# Patient Record
Sex: FEMALE | Employment: FULL TIME | URBAN - METROPOLITAN AREA
[De-identification: names, ages, dates, MRNs, and addresses within clinical notes are randomized per-mention and may not be internally consistent; named-entity substitution may affect disease eponyms.]

---

## 2017-01-16 ENCOUNTER — TELEPHONE (OUTPATIENT)
Dept: OBGYN | Facility: CLINIC | Age: 27
End: 2017-01-16

## 2017-01-16 NOTE — TELEPHONE ENCOUNTER
Patient called this morning and is 4 weeks pregnant.  She would like to set up her first prenatal appointment.  Please contact this patient.  Thank you!  Susan HUDDLESTON  Central Scheduler

## 2017-01-23 NOTE — TELEPHONE ENCOUNTER
Gayla, can you please help this pt get scheduled for NPN nurse and md appts.     Thanks.   Celina

## 2017-02-13 LAB
HBV SURFACE AG SERPL QL IA: NONREACTIVE
HIV 1+2 AB+HIV1 P24 AG SERPL QL IA: NONREACTIVE
RUBELLA ANTIBODY IGG QUANTITATIVE: NORMAL IU/ML
T PALLIDUM IGG SER QL: NONREACTIVE

## 2017-08-24 LAB — GROUP B STREP PCR: NEGATIVE

## 2017-08-28 ENCOUNTER — APPOINTMENT (OUTPATIENT)
Dept: ULTRASOUND IMAGING | Facility: CLINIC | Age: 27
End: 2017-08-28
Attending: OBSTETRICS & GYNECOLOGY
Payer: COMMERCIAL

## 2017-08-28 ENCOUNTER — HOSPITAL ENCOUNTER (INPATIENT)
Facility: CLINIC | Age: 27
LOS: 4 days | Discharge: HOME | End: 2017-09-02
Attending: OBSTETRICS & GYNECOLOGY | Admitting: OBSTETRICS & GYNECOLOGY
Payer: COMMERCIAL

## 2017-08-28 LAB
ABO + RH BLD: NORMAL
ABO + RH BLD: NORMAL
ALT SERPL W P-5'-P-CCNC: 21 U/L (ref 0–50)
AST SERPL W P-5'-P-CCNC: 25 U/L (ref 0–45)
BLD GP AB SCN SERPL QL: NORMAL
BLOOD BANK CMNT PATIENT-IMP: NORMAL
CREAT SERPL-MCNC: 0.45 MG/DL (ref 0.52–1.04)
CREAT UR-MCNC: 48 MG/DL
ERYTHROCYTE [DISTWIDTH] IN BLOOD BY AUTOMATED COUNT: 13.7 % (ref 10–15)
GFR SERPL CREATININE-BSD FRML MDRD: >90 ML/MIN/1.7M2
HCT VFR BLD AUTO: 35.8 % (ref 35–47)
HGB BLD-MCNC: 11.8 G/DL (ref 11.7–15.7)
MCH RBC QN AUTO: 29.5 PG (ref 26.5–33)
MCHC RBC AUTO-ENTMCNC: 33 G/DL (ref 31.5–36.5)
MCV RBC AUTO: 90 FL (ref 78–100)
PLATELET # BLD AUTO: 240 10E9/L (ref 150–450)
PROT UR-MCNC: 0.15 G/L
PROT/CREAT 24H UR: 0.31 G/G CR (ref 0–0.2)
RBC # BLD AUTO: 4 10E12/L (ref 3.8–5.2)
SPECIMEN EXP DATE BLD: NORMAL
WBC # BLD AUTO: 10.1 10E9/L (ref 4–11)

## 2017-08-28 PROCEDURE — 84450 TRANSFERASE (AST) (SGOT): CPT | Performed by: OBSTETRICS & GYNECOLOGY

## 2017-08-28 PROCEDURE — 76815 OB US LIMITED FETUS(S): CPT

## 2017-08-28 PROCEDURE — 25000131 ZZH RX MED GY IP 250 OP 636 PS 637: Performed by: OBSTETRICS & GYNECOLOGY

## 2017-08-28 PROCEDURE — 86850 RBC ANTIBODY SCREEN: CPT | Performed by: OBSTETRICS & GYNECOLOGY

## 2017-08-28 PROCEDURE — 84460 ALANINE AMINO (ALT) (SGPT): CPT | Performed by: OBSTETRICS & GYNECOLOGY

## 2017-08-28 PROCEDURE — 25000132 ZZH RX MED GY IP 250 OP 250 PS 637: Performed by: OBSTETRICS & GYNECOLOGY

## 2017-08-28 PROCEDURE — 86900 BLOOD TYPING SEROLOGIC ABO: CPT | Performed by: OBSTETRICS & GYNECOLOGY

## 2017-08-28 PROCEDURE — 85027 COMPLETE CBC AUTOMATED: CPT | Performed by: OBSTETRICS & GYNECOLOGY

## 2017-08-28 PROCEDURE — 99215 OFFICE O/P EST HI 40 MIN: CPT

## 2017-08-28 PROCEDURE — 99215 OFFICE O/P EST HI 40 MIN: CPT | Mod: 25

## 2017-08-28 PROCEDURE — 25000128 H RX IP 250 OP 636: Performed by: OBSTETRICS & GYNECOLOGY

## 2017-08-28 PROCEDURE — 86901 BLOOD TYPING SEROLOGIC RH(D): CPT | Performed by: OBSTETRICS & GYNECOLOGY

## 2017-08-28 PROCEDURE — 82565 ASSAY OF CREATININE: CPT | Performed by: OBSTETRICS & GYNECOLOGY

## 2017-08-28 PROCEDURE — 84156 ASSAY OF PROTEIN URINE: CPT | Performed by: OBSTETRICS & GYNECOLOGY

## 2017-08-28 RX ORDER — MORPHINE SULFATE 10 MG/ML
10 INJECTION, SOLUTION INTRAMUSCULAR; INTRAVENOUS ONCE
Status: DISCONTINUED | OUTPATIENT
Start: 2017-08-28 | End: 2017-08-28

## 2017-08-28 RX ORDER — METOCLOPRAMIDE 10 MG/1
10 TABLET ORAL EVERY 6 HOURS PRN
Status: DISCONTINUED | OUTPATIENT
Start: 2017-08-28 | End: 2017-08-29 | Stop reason: CLARIF

## 2017-08-28 RX ORDER — MORPHINE SULFATE 10 MG/ML
10 INJECTION, SOLUTION INTRAMUSCULAR; INTRAVENOUS ONCE
Status: COMPLETED | OUTPATIENT
Start: 2017-08-28 | End: 2017-08-28

## 2017-08-28 RX ORDER — MORPHINE SULFATE 10 MG/ML
10 INJECTION, SOLUTION INTRAMUSCULAR; INTRAVENOUS ONCE
Status: DISCONTINUED | OUTPATIENT
Start: 2017-08-28 | End: 2017-08-28 | Stop reason: CLARIF

## 2017-08-28 RX ORDER — HYDROXYZINE HYDROCHLORIDE 50 MG/1
100 TABLET, FILM COATED ORAL EVERY 6 HOURS PRN
Status: DISCONTINUED | OUTPATIENT
Start: 2017-08-28 | End: 2017-08-29

## 2017-08-28 RX ORDER — METOCLOPRAMIDE 10 MG/1
10 TABLET ORAL
Status: DISCONTINUED | OUTPATIENT
Start: 2017-08-28 | End: 2017-08-28

## 2017-08-28 RX ORDER — HYDROMORPHONE HCL/0.9% NACL/PF 0.2MG/0.2
0.2 SYRINGE (ML) INTRAVENOUS
Status: DISCONTINUED | OUTPATIENT
Start: 2017-08-28 | End: 2017-08-31

## 2017-08-28 RX ORDER — ZOLPIDEM TARTRATE 5 MG/1
5 TABLET ORAL ONCE
Status: COMPLETED | OUTPATIENT
Start: 2017-08-28 | End: 2017-08-29

## 2017-08-28 RX ORDER — HYDROXYZINE HYDROCHLORIDE 50 MG/1
100 TABLET, FILM COATED ORAL ONCE
Status: COMPLETED | OUTPATIENT
Start: 2017-08-28 | End: 2017-08-28

## 2017-08-28 RX ADMIN — HYDROXYZINE HYDROCHLORIDE 100 MG: 50 TABLET, FILM COATED ORAL at 15:25

## 2017-08-28 RX ADMIN — METOCLOPRAMIDE 10 MG: 10 TABLET ORAL at 14:36

## 2017-08-28 RX ADMIN — MORPHINE SULFATE 10 MG: 10 INJECTION, SOLUTION INTRAMUSCULAR; INTRAVENOUS at 14:44

## 2017-08-28 RX ADMIN — METOCLOPRAMIDE 10 MG: 10 TABLET ORAL at 21:48

## 2017-08-28 RX ADMIN — HYDROXYZINE HYDROCHLORIDE 100 MG: 50 TABLET, FILM COATED ORAL at 21:48

## 2017-08-28 RX ADMIN — Medication 0.2 MG: at 19:43

## 2017-08-28 NOTE — H&P
[facility]    History and Physical  Obstetrics and Gynecology     Date of Admission:  2017    Assessment & Plan   Dorota Perez is a 26 year old female  who presents with pre-eclampsia for overnight observation  ASSESSMENT:   IUP @ 36w2d  NST reactive.  Category  I    PLAN:   - Pre-eclampsia: meets criteria with elevated BPs in clinic 140s/90s this morning and protein/creatinine ratio 0.31. Remainder of HELLPs normal. Headache is concerning but improving with medication and BPs normal range while in hospital. Discussed patient with Dr. Theresa Degroot of  Perinatology, plan to observe overnight for development of any severe symptoms or signs and decide in AM whether to induce now or wait until 37w or immediately if severe features develop.  - Headache: improved with medications, may continue metoclopramide and hydroxyzine, will change opioid to hydromorphone IV  - Fetal status: continuous monitoring, and will get growth US tonight  - Disposition: pending evaluation and observation    Khalif Angel MD (pager [phone])  [facility]    History of Present Illness   Dorota Perez is a 26 year old female  @ 36w2d Estimated Date of Delivery: Sep 23, 2017 is calculated from LMP consistent with 16w6d ultrasound is [facility]  for [facility]. She has been experiencing a frontal headache over the past 3 days, unrelieved by acetaminophen or oxycodone at home this weekend. Came to clinic this morning and blood pressure was 142/87. Sent to L&D for monitoring and labs. She has a history of GHTN last pregnancy and was induced at 37w. She had a similar headache last time and it did not resolve until after delivery. No changes in vision, upper abdominal pain. Baby moving normally, no vaginal bleeding or leakage of fluid. Patient denies headache, chest pain, shortness of breath, nausea, vomiting, diarrhea, dysuria, fever, weakness,  increased swelling.      PRENATAL COURSE  Prenatal care was received at [facility] and the course was complicated by history of GHTN    Recent Labs   Lab Test  08/28/17   1504   ABO  A   RH  Pos   AS  Neg     Rhogam not indicated   Recent Labs   Lab Test 02/13/17   HEPBANG  nonreactive   HIAGAB  nonreactive   RUQIGG  immune       Past Medical History    I have reviewed this patient's medical history and updated it with pertinent information if needed.   Past Medical History:   Diagnosis Date     NO ACTIVE PROBLEMS (aka NONE)      Uncomplicated asthma     exercise induced       Past Surgical History   I have reviewed this patient's surgical history and updated it with pertinent information if needed.  Past Surgical History:   Procedure Laterality Date     APPENDECTOMY  2010       Prior to Admission Medications   Prior to Admission Medications   Prescriptions Last Dose Informant Patient Reported? Taking?   OXYCODONE HCL PO 8/27/2017 at Unknown time  Yes Yes   Sig: Take 10 mg by mouth   Prenatal Vit-Fe Fumarate-FA (PRENATAL MULTIVITAMIN  PLUS IRON) 27-0.8 MG TABS   Yes Yes   Sig: Take 1 tablet by mouth daily   norethindrone (MICRONOR) 0.35 MG per tablet   No No   Sig: Take 1 tablet (0.35 mg) by mouth daily      Facility-Administered Medications: None     Allergies   No Known Allergies    Social History   I have reviewed this patient's social history and updated it with pertinent information if needed. Dorota Perez  reports that she has quit smoking. She does not have any smokeless tobacco history on file. She reports that she drinks alcohol. She reports that she does not use illicit drugs.    Family History   I have reviewed this patient's family history and updated it with pertinent information if needed.   Family History   Problem Relation Age of Onset     Hypertension Mother      Hypertension Father      Rheumatoid Arthritis Maternal Grandmother      DIABETES Maternal  Grandfather      Parkinsonism Paternal Grandmother      Alzheimer Disease Paternal Grandfather        Physical Exam   Temp: 98.4  F (36.9  C) Temp src: Oral BP: 121/75              Vital Signs with Ranges  Temp:  [98.4  F (36.9  C)-99  F (37.2  C)] 98.4  F (36.9  C)  BP: (114-122)/(71-84) 121/75    Abdomen: gravid, single vertex fetus, non-tender, EFW 3000 g  Cervical Exam: closed/ 30/ Posterior/ firm/ -3     Fetal Heart Tones: 140 bpm baseline, moderate variablility, + accels, no decels and Category I  TOCO:   external monitor and frequency q 10-20 minutes    Constitutional: healthy, alert, active and no distress   Respiratory: breathing unlabored  Cardiovascular: regular rate and rhythm, pedal pulses 2+  Musculoskeletal: lower extremities nontender, edema: pitting and 1+  Neurologic: Awake, alert, oriented to name, place and time. Reflexes 3+ in biceps and quadriceps, no clonus in ankles bilaterally  Neuropsychiatric: cooperative, mood good, stable, appropriate

## 2017-08-28 NOTE — PROVIDER NOTIFICATION
08/28/17 1607   Provider Notification   Provider Name/Title Dr Angel   Method of Notification Phone   Notification Reason Lab/Diagnostic Study;Maternal Vital Sign Change   Reviewed labs and BP's since admission.  Headache slightly better.  He will come here to discuss POC with pt.

## 2017-08-28 NOTE — IP AVS SNAPSHOT
MRN:[id number]                      After Visit Summary   8/28/2017    Dorota Perez    MRN: [id number]           Thank you!     Thank you for choosing [facility] for your care. Our goal is always to provide you with excellent care. Hearing back from our patients is one way we can continue to improve our services. Please take a few minutes to complete the written survey that you may receive in the mail after you visit. If you would like to speak to someone directly about your visit please contact Patient Relations at [phone]. Thank you!          Patient Information     Date Of Birth          1990        About your hospital stay     You were admitted on:  August 28, 2017 You last received care in the:  [facility]    You were discharged on:  September 2, 2017       Who to Call     For medical emergencies, please call 911.  For non-urgent questions about your medical care, please call your primary care provider or clinic, None          Attending Provider     Provider Specialty    Khalif Angel MD OB/Gyn    Waldo Reno MD OB/Gyn    Bright Rubalcava MD OB/Gyn    Esha Correa MD OB/Gyn       Primary Care Provider    None Specified      After Care Instructions     Activity       Review discharge instructions            Diet       Resume previous diet            Discharge Instructions - Postpartum visit       Schedule postpartum visit with your OB provider and return to clinic in 6 weeks.                  Further instructions from your care team       Postpartum Vaginal Delivery Instructions  Follow up in clinic in 6 weeks for postpartum check.  [facility] Lactation: [phone]    Activity       Ask family and friends for help when you need it.    Do not place anything in your vagina for 6 weeks.    You are not restricted on other activities, but take it easy for a few weeks to allow your body to recover from delivery.   You are able to do any activities you feel up to that point.    No driving until you have stopped taking your pain medications (usually two weeks after delivery).     Call your health care provider if you have any of these symptoms:       Increased pain, swelling, redness, or fluid around your stiches from an episiotomy or perineal tear.    A fever above 100.4 F (38 C) with or without chills when placing a thermometer under your tongue.    You soak a sanitary pad with blood within 1 hour, or you see blood clots larger than a golf ball.    Bleeding that lasts more than 6 weeks.    Vaginal discharge that smells bad.    Severe pain, cramping or tenderness in your lower belly area.    A need to urinate more frequently (use the toilet more often), more urgently (use the toilet very quickly), or it burns when you urinate.    Nausea and vomiting.    Redness, swelling or pain around a vein in your leg.    Problems breastfeeding or a red or painful area on your breast.    Chest pain and cough or are gasping for air.    Problems coping with sadness, anxiety, or depression.  If you have any concerns about hurting yourself or the baby, call your provider immediately.     You have questions or concerns after you return home.     Keep your hands clean:  Always wash your hands before touching your perineal area and stitches.  This helps reduce your risk of infection.  If your hands aren't dirty, you may use an alcohol hand-rub to clean your hands. Keep your nails clean and short.        Pending Results     Date and Time Order Name Status Description    8/31/2017 1540 Placenta path order and indications In process             Statement of Approval     Ordered          09/02/17 0754  I have reviewed and agree with all the recommendations and orders detailed in this document.  EFFECTIVE NOW     Approved and electronically signed by:  Waldo Reno MD             Admission Information     Date & Time Provider Department Dept. Phone  "   2017 Esha Correa MD [facility] [phone]      Your Vitals Were     Blood Pressure Pulse Temperature Respirations Last Period       129/74 58 98.3  F (36.8  C) (Oral) 16 2016 (Exact Date)       MyChart Information     [employer] lets you send messages to your doctor, view your test results, renew your prescriptions, schedule appointments and more. To sign up, go to [email or web address] . Click on \"Log in\" on the left side of the screen, which will take you to the Welcome page. Then click on \"Sign up Now\" on the right side of the page.     You will be asked to enter the access code listed below, as well as some personal information. Please follow the directions to create your username and password.     Your access code is: [email or web address]  Expires: 2017  8:20 AM     Your access code will  in 90 days. If you need help or a new code, please call your [facility] clinic or [phone].        Care EveryWhere ID     This is your Care EveryWhere ID. This could be used by other organizations to access your [facility] medical records  [id number]        Equal Access to Services     NICHO MANDUJANO AH: Hadjerson savageo Sojasvir, waaxda luqadaha, qaybta kaalmada adeegyada, ekaterina persaud. So [facility] [phone].    ATENCIÓN: Si habla español, tiene a giron disposición servicios gratuitos de asistencia lingüística. Jose Guadalupe al [phone].    We comply with applicable federal civil rights laws and [place] laws. We do not discriminate on the basis of race, color, national origin, age, disability sex, sexual orientation or gender identity.               Review of your medicines      START taking        Dose / Directions    ibuprofen 600 MG tablet   Commonly known as:  ADVIL/MOTRIN        Dose:  600 mg   Take 1 tablet (600 mg) by mouth every 6 hours as needed for pain Take w/ food   Quantity:  30 tablet   Refills:  0         CONTINUE these medicines which " have NOT CHANGED        Dose / Directions    prenatal multivitamin plus iron 27-0.8 MG Tabs per tablet        Dose:  1 tablet   Take 1 tablet by mouth daily   Refills:  0         STOP taking     norethindrone 0.35 MG per tablet   Commonly known as:  MICRONOR           OXYCODONE HCL PO                Where to get your medicines      Some of these will need a paper prescription and others can be bought over the counter. Ask your nurse if you have questions.     Bring a paper prescription for each of these medications     ibuprofen 600 MG tablet                Protect others around you: Learn how to safely use, store and throw away your medicines at www.disposemymeds.org.             Medication List: This is a list of all your medications and when to take them. Check marks below indicate your daily home schedule. Keep this list as a reference.      Medications           Morning Afternoon Evening Bedtime As Needed    ibuprofen 600 MG tablet   Commonly known as:  ADVIL/MOTRIN   Take 1 tablet (600 mg) by mouth every 6 hours as needed for pain Take w/ food   Last time this was given:  800 mg on 9/2/2017 12:27 AM                                   prenatal multivitamin plus iron 27-0.8 MG Tabs per tablet   Take 1 tablet by mouth daily

## 2017-08-28 NOTE — PROVIDER NOTIFICATION
08/28/17 1635   Provider Notification   Provider Name/Title Dr Angel   Method of Notification At Bedside   Here discussing POC with pt.  She may eat tonight, BP's Q4 hr now, pain meds for headache tonight.  Growth ultrasound tonight.  Continuous monitoring.  Will continue to monitor and update MD as needed.

## 2017-08-28 NOTE — PLAN OF CARE
Problem: Goal Outcome Summary  Goal: Goal Outcome Summary   @ 36 2/7 weeks here for PIH evaluation.  Patient stated that she was pre eclamptic with her 3 year old son and was treated with magnesium sulfate IV and delivered at 37 weeks without complications.  Patient states she has felt baby moving and denies leaking of fluid, bleeding or epigastric pain.  She has had a migraine with visual changes as spots since Wednesday and has been evaluated by Dr Angel in the office on .  Today the migraine increased in intensity and visual changes are now flashing lights.  She is very anxious because both her maternal grandmother and aunt had an eclamptic seizure and her mother had pre eclampsia with 2 of her 3 pregnancies.   She is most anxious about the potential for seizures.  Pre eclampsia was discussed and questions were answered.   VTX by [place] - EFM applied & explained.  Saline lock was started for blood draw.  Analgesics medications were given.  See Doc flow sheets for assessment.  Dr Angel called the patient in after evaluation in the office and gave orders for care at that time.  Bedside report was given to Lashell KEITH RN and cares were turned over at 1530.

## 2017-08-28 NOTE — IP AVS SNAPSHOT
[facility]    [address] [facility] [address]    Phone:  [phone]    Fax:  [phone]                                       After Visit Summary   8/28/2017    Dorota Perez    MRN: [id number]           After Visit Summary Signature Page     I have received my discharge instructions, and my questions have been answered. I have discussed any challenges I see with this plan with the nurse or doctor.    ..........................................................................................................................................  Patient/Patient Representative Signature      ..........................................................................................................................................  Patient Representative Print Name and Relationship to Patient    ..................................................               ................................................  Date                                            Time    ..........................................................................................................................................  Reviewed by Signature/Title    ...................................................              ..............................................  Date                                                            Time

## 2017-08-29 PROBLEM — Z86.69 HISTORY OF MIGRAINE DURING PREGNANCY: Status: ACTIVE | Noted: 2017-08-29

## 2017-08-29 PROBLEM — Z87.59 HISTORY OF MIGRAINE DURING PREGNANCY: Status: ACTIVE | Noted: 2017-08-29

## 2017-08-29 PROBLEM — O13.9 GESTATIONAL HYPERTENSION AFFECTING SECOND PREGNANCY: Status: ACTIVE | Noted: 2017-08-29

## 2017-08-29 PROBLEM — O26.893 HEADACHE IN PREGNANCY, ANTEPARTUM, THIRD TRIMESTER: Status: ACTIVE | Noted: 2017-08-29

## 2017-08-29 PROBLEM — R51.9 HEADACHE IN PREGNANCY, ANTEPARTUM, THIRD TRIMESTER: Status: ACTIVE | Noted: 2017-08-29

## 2017-08-29 PROCEDURE — 25000132 ZZH RX MED GY IP 250 OP 250 PS 637: Performed by: OBSTETRICS & GYNECOLOGY

## 2017-08-29 PROCEDURE — 25000128 H RX IP 250 OP 636: Performed by: OBSTETRICS & GYNECOLOGY

## 2017-08-29 PROCEDURE — 12000029 ZZH R&B OB INTERMEDIATE

## 2017-08-29 PROCEDURE — 25000131 ZZH RX MED GY IP 250 OP 636 PS 637: Performed by: OBSTETRICS & GYNECOLOGY

## 2017-08-29 RX ORDER — HYDROXYZINE HYDROCHLORIDE 25 MG/1
25 TABLET, FILM COATED ORAL EVERY 6 HOURS PRN
Status: DISCONTINUED | OUTPATIENT
Start: 2017-08-29 | End: 2017-09-02 | Stop reason: HOSPADM

## 2017-08-29 RX ORDER — ACETAMINOPHEN 325 MG/1
650 TABLET ORAL EVERY 4 HOURS PRN
Status: DISCONTINUED | OUTPATIENT
Start: 2017-08-29 | End: 2017-09-02 | Stop reason: HOSPADM

## 2017-08-29 RX ORDER — ONDANSETRON 2 MG/ML
4 INJECTION INTRAMUSCULAR; INTRAVENOUS EVERY 6 HOURS PRN
Status: DISCONTINUED | OUTPATIENT
Start: 2017-08-29 | End: 2017-09-02 | Stop reason: HOSPADM

## 2017-08-29 RX ORDER — SODIUM CHLORIDE, SODIUM LACTATE, POTASSIUM CHLORIDE, CALCIUM CHLORIDE 600; 310; 30; 20 MG/100ML; MG/100ML; MG/100ML; MG/100ML
INJECTION, SOLUTION INTRAVENOUS CONTINUOUS
Status: ACTIVE | OUTPATIENT
Start: 2017-08-29 | End: 2017-08-29

## 2017-08-29 RX ORDER — BETAMETHASONE SODIUM PHOSPHATE AND BETAMETHASONE ACETATE 3; 3 MG/ML; MG/ML
12 INJECTION, SUSPENSION INTRA-ARTICULAR; INTRALESIONAL; INTRAMUSCULAR; SOFT TISSUE EVERY 24 HOURS
Status: COMPLETED | OUTPATIENT
Start: 2017-08-29 | End: 2017-08-30

## 2017-08-29 RX ADMIN — METOCLOPRAMIDE 10 MG: 10 TABLET ORAL at 04:04

## 2017-08-29 RX ADMIN — SODIUM CHLORIDE, POTASSIUM CHLORIDE, SODIUM LACTATE AND CALCIUM CHLORIDE: 600; 310; 30; 20 INJECTION, SOLUTION INTRAVENOUS at 02:04

## 2017-08-29 RX ADMIN — Medication 0.2 MG: at 01:07

## 2017-08-29 RX ADMIN — ZOLPIDEM TARTRATE 5 MG: 5 TABLET, FILM COATED ORAL at 23:15

## 2017-08-29 RX ADMIN — BETAMETHASONE SODIUM PHOSPHATE AND BETAMETHASONE ACETATE 12 MG: 3; 3 INJECTION, SUSPENSION INTRA-ARTICULAR; INTRALESIONAL; INTRAMUSCULAR at 10:58

## 2017-08-29 RX ADMIN — HYDROXYZINE HYDROCHLORIDE 100 MG: 50 TABLET, FILM COATED ORAL at 04:04

## 2017-08-29 NOTE — PLAN OF CARE
Report received, cares assumed. Patient still complaining of HA (8/10 pain), using ice pack and resting. Denies epigastric pain, edema +1, reflexes normal, 1 beat of clonus bilateral. Will await for OB to round for plan for the day.

## 2017-08-29 NOTE — PLAN OF CARE
Patient still has a headache, 8/10 on pain scale, denies wanting medication since it didn't help before. Is just using ice pack for headache. Patient will notify nurse when ready for eves NST.

## 2017-08-29 NOTE — PROVIDER NOTIFICATION
08/29/17 0746   Provider Notification   Provider Name/Title Dr Reno   Method of Notification At Bedside   OB at bedside, updated on BPs, last labs, continued HA even after medication, viewed paper tracing at bedside. OB will consult with MFM and determine plan. Orders received for NST per shift.

## 2017-08-29 NOTE — PLAN OF CARE
1740-Patient called out, tearful stating that her head hurts worse now. Using ice pack and gave blanket to cover head per request to darken room. Medication offered but declined. Also c/o nausea, sipping Sprite and saltines given. Declines zofran at this time. Left to rest.

## 2017-08-29 NOTE — PROGRESS NOTES
[facility] Antepartum Note    Dorota Perez MRN# [id number]   Age: 26 year old YOB: 1990           Subjective:     Pt still having HA requiring Dilaudid IV to get relief.  No scotomata, RUQ pain. Fetus active, no VB, UC's, SROM.  She states her family has a Hx of eclampsia in pregnancy.          Objective:   Patient Vitals for the past 8 hrs:   BP Temp Temp src Resp   08/29/17 1051 134/81 - - 18   08/29/17 0746 124/75 98.9  F (37.2  C) Oral 18   08/29/17 0620 109/70 - - 15   08/29/17 0400 - - - 16        Fetal Heart Rate:    Monitor: External US    Variability: Moderate    Baseline Rate: 140 bpm    Fetal Heart Rate Tracing: Tier 1 (normal)    Abd:  Gravid, NT  Ext:  No CT, no edema  DTRs:  2+ bilaterally, no clonus    Labs 8/28:  Results for DOROTA PEREZ (MRN [id number]) as of 8/29/2017 11:51   Ref. Range 8/28/2017 13:55 8/28/2017 15:04   Creatinine Latest Ref Range: 0.52 - 1.04 mg/dL  0.45 (L)   GFR Estimate Latest Ref Range: >60 mL/min/1.7m2  >90   GFR Estimate If Black Latest Ref Range: >60 mL/min/1.7m2  >90   ALT Latest Ref Range: 0 - 50 U/L  21   AST Latest Ref Range: 0 - 45 U/L  25   Creatinine Urine Latest Units: mg/dL 48    Protein Random Urine Latest Units: g/L 0.15    Protein Total Urine g/gr Creatinine Latest Ref Range: 0 - 0.2 g/g Cr 0.31 (H)    WBC Latest Ref Range: 4.0 - 11.0 10e9/L  10.1   Hemoglobin Latest Ref Range: 11.7 - 15.7 g/dL  11.8   Hematocrit Latest Ref Range: 35.0 - 47.0 %  35.8   Platelet Count Latest Ref Range: 150 - 450 10e9/L  240           Assessment:   1)  IUP at 36w3d    2)  Gest HTN - BPs normalized on bedrest in L&D    3)  Severe HA, possible migraine exacerbation vs severe PIH sx.    4)  GBS Neg - done 8/24/17         Plan:   1)  I d/w Dr. Degroot in [facility] today, and she does recommend moving towards delivery at this point due to persistent severe HA requiring IV narcotics and still not completely relieved.  However, given the GA, would recommend first  doing course of  steroids to enhance lung maturity.  I reviewed this recommendation with the Pt, and she agrees.    2)  BMZ 12 mg IM given at 11AM, with repeat dose tomorrow.    3)  Will do Cervidil for cervical ripening starting tomorrow night assuming her Cx is still unfavorable, with induction of labor on  at which point delivery should occur at least 48 hours after first BMZ dose.      Waldo Reno MD

## 2017-08-29 NOTE — PROVIDER NOTIFICATION
08/28/17 214   Provider Notification   Provider Name/Title Dr Angel   Method of Notification Phone   Notification Reason Pain   Updated that BP's remain WNL, headache persists despite medication.  Adequate PO intake, dim lights, rest and medication given.  Pt also states she hasn't slept much in the past few days due to her headache, she is also anxious tonight.  Will given Vistaril and Reglan now.  One time dose of Ambien PRN at least one hour after Vistaril given if unable to sleep.  Will continue to monitor and update MD if needed.

## 2017-08-29 NOTE — PLAN OF CARE
Bedside report received from Lashell CALLOWAY RN.  Care assumed at this time.  Baseline assessment completed, see flowsheet for details.  Pt reports vistaril has made her more sleepy, no change in pain at this time.  Discussed pain management options and pt declines need for further intervention at this time.  Offered BHASKAR of ambien, pt reports she wants to wait and see if the vistaril helps her sleep first.  Discussed plan and POC with pt and pt verbalized understanding at this time.   Pt denies further needs/concerns at this time.  Will continue to monitor.

## 2017-08-29 NOTE — PROVIDER NOTIFICATION
"   08/29/17 0140   Provider Notification   Provider Name/Title Dr. Angel   Method of Notification Phone   Request Evaluate - Remote   Notification Reason Pain   Dr. Angel updated on pt's continued report of headache pain at 8/10 despite medication and environmental interventions.  Discussed pt's most recent BP's including 112/66.  Discussed time medications last given and pt's report that, \"nothing helps - this is what happened last time and the headache just doesn't go away until I deliver.  None of the medications help.\"  Discussed that pt has been awake and alert, reports sleeping on and off.  Dr. Angel verbalized understanding.  TORB for LR at 125mL/hr for 1 bag of fluid.  Discussed repeating PIH labs in AM if BP's increase to >140/90.  Will update pt on POC and continue to monitor.  "

## 2017-08-29 NOTE — PROVIDER NOTIFICATION
08/29/17 1051   Provider Notification   Provider Name/Title Dr Reno   Method of Notification At Bedside   OB spoke with MFM and recommendation is for BMZ now and repeat in 24 hours. Cervical ripening tomorrow evening. Ambien as needed for sleep.

## 2017-08-30 PROCEDURE — 25000128 H RX IP 250 OP 636: Performed by: OBSTETRICS & GYNECOLOGY

## 2017-08-30 PROCEDURE — 12000031 ZZH R&B OB CRITICAL

## 2017-08-30 PROCEDURE — 3E0P7GC INTRODUCTION OF OTHER THERAPEUTIC SUBSTANCE INTO FEMALE REPRODUCTIVE, VIA NATURAL OR ARTIFICIAL OPENING: ICD-10-PCS | Performed by: OBSTETRICS & GYNECOLOGY

## 2017-08-30 PROCEDURE — 25000132 ZZH RX MED GY IP 250 OP 250 PS 637: Performed by: OBSTETRICS & GYNECOLOGY

## 2017-08-30 RX ORDER — ZOLPIDEM TARTRATE 5 MG/1
5 TABLET ORAL ONCE
Status: COMPLETED | OUTPATIENT
Start: 2017-08-30 | End: 2017-08-30

## 2017-08-30 RX ADMIN — BETAMETHASONE SODIUM PHOSPHATE AND BETAMETHASONE ACETATE 12 MG: 3; 3 INJECTION, SUSPENSION INTRA-ARTICULAR; INTRALESIONAL; INTRAMUSCULAR at 10:47

## 2017-08-30 RX ADMIN — DINOPROSTONE 10 MG: 10 INSERT VAGINAL at 19:59

## 2017-08-30 RX ADMIN — ZOLPIDEM TARTRATE 5 MG: 5 TABLET, FILM COATED ORAL at 21:59

## 2017-08-30 NOTE — PLAN OF CARE
Monitors applied for NSt at 1430, initially FHTs 140s, then tachycardic in the 160-170s for 20 minutes, patient states that baby was very active during that, now baseline 140s, moderate variability with accels. Monitors removed. Dr Rubalcava notified on all above. Bedside report given to Leonie Torrez RN who will assume cares.

## 2017-08-30 NOTE — PLAN OF CARE
Problem: Goal Outcome Summary  Goal: Goal Outcome Summary  Outcome: Therapy, progress toward functional goals as expected  1515:  Assumed cares on patient.  Report received from Eleni Rajput RN.   1910:  EUM and EFM placed to obtain a NST prior to cervical ripening.  1930:  Minimal variability and no accels seen.  Patient turned to RL side from LL side.  Dr. Rubalcava notified of non-reactive FHR strip.  Plan to obtain a reactive NST and then do a SVE to assess need for cervical ripening.  Orders received from Dr. Rubalcava to place cervadil if Butler score is less than 5.   1957:  SVE done.  SVE 1.5/50/-3.  Butler score=3.   1959:  Cervadil placed per MD order.  Will continue to monitor.   2315:  Report given to Fiordaliza Bush, RN  Leonie Torrez RN

## 2017-08-30 NOTE — PROVIDER NOTIFICATION
08/30/17 1100   Provider Notification   Provider Name/Title Dr Rubalcava   Method of Notification In Department   OB at nurses station, updated on second BMZ given. Orders received for cervidil this evening at 1900.

## 2017-08-30 NOTE — PLAN OF CARE
1945 Received report from Inez AUGUSTE and Eleni KEITH and assumed cares.  2105 VSS done WNL. Had shower this evening.C/O headache, does n't want to be treated as she said nothing is working and uses ice pack which helps her.Lights are down now and wants to sleep. Also she wants to try Ambien tonite.  2330 Pt was snoozing and said got 1-2 hr of sleep.VSS checked WNL.Ambein given and light down.She will call nurse whenever she awakes to check BP.  2335 Report given to Moraima BRODERICK who assumes cares.

## 2017-08-30 NOTE — PLAN OF CARE
Report received from Chayito HERRING RN, cares assumed. Patient slept most of the night but does still have a headache and rates it 6/10. Reflexes normal, 1 beat of clonus on left side only. No swelling. Plan for BMZ at 1100 and cervical ripening tonight (assuming she needs it). Patient feels good about plan.

## 2017-08-30 NOTE — PLAN OF CARE
Report received from Rozina, RN.  Care assumed at this time.  Per Rozina, RN, VS checked, pt resting and pt instructed to call this RN if any needs, questions, or concerns.  Will continue to monitor.

## 2017-08-30 NOTE — PLAN OF CARE
Second BMZ given, patient c/o HA 8/10, denies medication, using ice pack and essential oils. Still feeling slightly nauseated. Plan for shift NST at 1430. Will allow patient to rest.

## 2017-08-30 NOTE — PLAN OF CARE
Patient still c/o headache, using ice pack and aromatherapy. Monitors applied for NST. Patient expressed desire to have postpartum tubal ligation, will inform Dr Rubalcava.

## 2017-08-30 NOTE — PLAN OF CARE
Dr. Reno updated by phone re: pt's decreased HA and able to sleep after Ambien. He is OK with pt tx to pp for today awaiting cervical ripening this darshan. Pt informed of plan to tx her to pp until the time of cervical ripening; states understanding and is agreeable with POC.

## 2017-08-30 NOTE — PROGRESS NOTES
OB PN    Patient c/o ongoing HA.  Denies visual changes or RUQ pain    BPs normal range last 24 hrs    FHR Category 1 with few ctx  U/S on 8/28 - Vtx  EFW 2818gms   ROD normal    A/P:  GHTN with severe HA thus severe PIH.  Hx of same requiring indxn at 37 week.  FHx of eclampsia reported           Management reviewed with Perinatology tomorrow with plan to induce labor following a course of steroids.           Will receive 2nd dose of Betamethasone at 1100.           Cervidil to be placed tonight with pitocin induction planned for AM tomorrow.     Heber Rubalcava MD

## 2017-08-31 ENCOUNTER — ANESTHESIA EVENT (OUTPATIENT)
Dept: OBGYN | Facility: CLINIC | Age: 27
End: 2017-08-31
Payer: COMMERCIAL

## 2017-08-31 ENCOUNTER — ANESTHESIA (OUTPATIENT)
Dept: OBGYN | Facility: CLINIC | Age: 27
End: 2017-08-31
Payer: COMMERCIAL

## 2017-08-31 LAB
ALT SERPL W P-5'-P-CCNC: 27 U/L (ref 0–50)
AST SERPL W P-5'-P-CCNC: 23 U/L (ref 0–45)
CREAT SERPL-MCNC: 0.54 MG/DL (ref 0.52–1.04)
ERYTHROCYTE [DISTWIDTH] IN BLOOD BY AUTOMATED COUNT: 14 % (ref 10–15)
GFR SERPL CREATININE-BSD FRML MDRD: >90 ML/MIN/1.7M2
HCT VFR BLD AUTO: 37.9 % (ref 35–47)
HGB BLD-MCNC: 12.2 G/DL (ref 11.7–15.7)
MCH RBC QN AUTO: 29.5 PG (ref 26.5–33)
MCHC RBC AUTO-ENTMCNC: 32.2 G/DL (ref 31.5–36.5)
MCV RBC AUTO: 92 FL (ref 78–100)
PLATELET # BLD AUTO: 260 10E9/L (ref 150–450)
RBC # BLD AUTO: 4.13 10E12/L (ref 3.8–5.2)
WBC # BLD AUTO: 12.2 10E9/L (ref 4–11)

## 2017-08-31 PROCEDURE — 00HU33Z INSERTION OF INFUSION DEVICE INTO SPINAL CANAL, PERCUTANEOUS APPROACH: ICD-10-PCS | Performed by: ANESTHESIOLOGY

## 2017-08-31 PROCEDURE — 12000031 ZZH R&B OB CRITICAL

## 2017-08-31 PROCEDURE — 37000011 ZZH ANESTHESIA WARD SERVICE

## 2017-08-31 PROCEDURE — 82565 ASSAY OF CREATININE: CPT | Performed by: OBSTETRICS & GYNECOLOGY

## 2017-08-31 PROCEDURE — 40000671 ZZH STATISTIC ANESTHESIA CASE

## 2017-08-31 PROCEDURE — 85027 COMPLETE CBC AUTOMATED: CPT | Performed by: OBSTETRICS & GYNECOLOGY

## 2017-08-31 PROCEDURE — 25000125 ZZHC RX 250: Performed by: OBSTETRICS & GYNECOLOGY

## 2017-08-31 PROCEDURE — 84460 ALANINE AMINO (ALT) (SGPT): CPT | Performed by: OBSTETRICS & GYNECOLOGY

## 2017-08-31 PROCEDURE — 72200001 ZZH LABOR CARE VAGINAL DELIVERY SINGLE

## 2017-08-31 PROCEDURE — 84450 TRANSFERASE (AST) (SGOT): CPT | Performed by: OBSTETRICS & GYNECOLOGY

## 2017-08-31 PROCEDURE — 88307 TISSUE EXAM BY PATHOLOGIST: CPT | Mod: 26 | Performed by: OBSTETRICS & GYNECOLOGY

## 2017-08-31 PROCEDURE — 3E0R3CZ INTRODUCTION OF REGIONAL ANESTHETIC INTO SPINAL CANAL, PERCUTANEOUS APPROACH: ICD-10-PCS | Performed by: ANESTHESIOLOGY

## 2017-08-31 PROCEDURE — 25000132 ZZH RX MED GY IP 250 OP 250 PS 637: Performed by: OBSTETRICS & GYNECOLOGY

## 2017-08-31 PROCEDURE — 36415 COLL VENOUS BLD VENIPUNCTURE: CPT | Performed by: OBSTETRICS & GYNECOLOGY

## 2017-08-31 PROCEDURE — 25000128 H RX IP 250 OP 636: Performed by: ANESTHESIOLOGY

## 2017-08-31 PROCEDURE — 25000128 H RX IP 250 OP 636: Performed by: OBSTETRICS & GYNECOLOGY

## 2017-08-31 PROCEDURE — 88307 TISSUE EXAM BY PATHOLOGIST: CPT | Performed by: OBSTETRICS & GYNECOLOGY

## 2017-08-31 RX ORDER — OXYCODONE AND ACETAMINOPHEN 5; 325 MG/1; MG/1
1 TABLET ORAL
Status: DISCONTINUED | OUTPATIENT
Start: 2017-08-31 | End: 2017-08-31

## 2017-08-31 RX ORDER — OXYTOCIN 10 [USP'U]/ML
10 INJECTION, SOLUTION INTRAMUSCULAR; INTRAVENOUS
Status: COMPLETED | OUTPATIENT
Start: 2017-08-31 | End: 2017-08-31

## 2017-08-31 RX ORDER — BISACODYL 10 MG
10 SUPPOSITORY, RECTAL RECTAL DAILY PRN
Status: DISCONTINUED | OUTPATIENT
Start: 2017-09-02 | End: 2017-09-02 | Stop reason: HOSPADM

## 2017-08-31 RX ORDER — ACETAMINOPHEN 325 MG/1
650 TABLET ORAL EVERY 4 HOURS PRN
Status: DISCONTINUED | OUTPATIENT
Start: 2017-08-31 | End: 2017-08-31

## 2017-08-31 RX ORDER — OXYTOCIN/0.9 % SODIUM CHLORIDE 30/500 ML
340 PLASTIC BAG, INJECTION (ML) INTRAVENOUS CONTINUOUS PRN
Status: DISCONTINUED | OUTPATIENT
Start: 2017-08-31 | End: 2017-09-02 | Stop reason: HOSPADM

## 2017-08-31 RX ORDER — OXYCODONE HYDROCHLORIDE 5 MG/1
5-10 TABLET ORAL
Status: DISCONTINUED | OUTPATIENT
Start: 2017-08-31 | End: 2017-09-02 | Stop reason: HOSPADM

## 2017-08-31 RX ORDER — SCOLOPAMINE TRANSDERMAL SYSTEM 1 MG/1
1 PATCH, EXTENDED RELEASE TRANSDERMAL ONCE
Status: DISCONTINUED | OUTPATIENT
Start: 2017-08-31 | End: 2017-08-31 | Stop reason: CLARIF

## 2017-08-31 RX ORDER — MISOPROSTOL 200 UG/1
800 TABLET ORAL
Status: DISCONTINUED | OUTPATIENT
Start: 2017-08-31 | End: 2017-09-02 | Stop reason: HOSPADM

## 2017-08-31 RX ORDER — HYDROMORPHONE HYDROCHLORIDE 1 MG/ML
.3-.5 INJECTION, SOLUTION INTRAMUSCULAR; INTRAVENOUS; SUBCUTANEOUS EVERY 30 MIN PRN
Status: DISCONTINUED | OUTPATIENT
Start: 2017-08-31 | End: 2017-09-02 | Stop reason: HOSPADM

## 2017-08-31 RX ORDER — HYDROCORTISONE 2.5 %
CREAM (GRAM) TOPICAL 3 TIMES DAILY PRN
Status: DISCONTINUED | OUTPATIENT
Start: 2017-08-31 | End: 2017-09-02 | Stop reason: HOSPADM

## 2017-08-31 RX ORDER — AMOXICILLIN 250 MG
1-2 CAPSULE ORAL 2 TIMES DAILY
Status: DISCONTINUED | OUTPATIENT
Start: 2017-08-31 | End: 2017-09-02 | Stop reason: HOSPADM

## 2017-08-31 RX ORDER — OXYTOCIN/0.9 % SODIUM CHLORIDE 30/500 ML
1-24 PLASTIC BAG, INJECTION (ML) INTRAVENOUS CONTINUOUS
Status: DISCONTINUED | OUTPATIENT
Start: 2017-08-31 | End: 2017-08-31

## 2017-08-31 RX ORDER — METHYLERGONOVINE MALEATE 0.2 MG/ML
200 INJECTION INTRAVENOUS
Status: DISCONTINUED | OUTPATIENT
Start: 2017-08-31 | End: 2017-08-31

## 2017-08-31 RX ORDER — NALOXONE HYDROCHLORIDE 0.4 MG/ML
.1-.4 INJECTION, SOLUTION INTRAMUSCULAR; INTRAVENOUS; SUBCUTANEOUS
Status: DISCONTINUED | OUTPATIENT
Start: 2017-08-31 | End: 2017-08-31 | Stop reason: CLARIF

## 2017-08-31 RX ORDER — CARBOPROST TROMETHAMINE 250 UG/ML
250 INJECTION, SOLUTION INTRAMUSCULAR
Status: DISCONTINUED | OUTPATIENT
Start: 2017-08-31 | End: 2017-08-31

## 2017-08-31 RX ORDER — LIDOCAINE 40 MG/G
CREAM TOPICAL
Status: DISCONTINUED | OUTPATIENT
Start: 2017-08-31 | End: 2017-08-31

## 2017-08-31 RX ORDER — OXYTOCIN 10 [USP'U]/ML
10 INJECTION, SOLUTION INTRAMUSCULAR; INTRAVENOUS
Status: DISCONTINUED | OUTPATIENT
Start: 2017-08-31 | End: 2017-09-02 | Stop reason: HOSPADM

## 2017-08-31 RX ORDER — NALBUPHINE HYDROCHLORIDE 10 MG/ML
2.5-5 INJECTION, SOLUTION INTRAMUSCULAR; INTRAVENOUS; SUBCUTANEOUS EVERY 6 HOURS PRN
Status: DISCONTINUED | OUTPATIENT
Start: 2017-08-31 | End: 2017-08-31 | Stop reason: CLARIF

## 2017-08-31 RX ORDER — NALOXONE HYDROCHLORIDE 0.4 MG/ML
.1-.4 INJECTION, SOLUTION INTRAMUSCULAR; INTRAVENOUS; SUBCUTANEOUS
Status: DISCONTINUED | OUTPATIENT
Start: 2017-08-31 | End: 2017-08-31

## 2017-08-31 RX ORDER — IBUPROFEN 400 MG/1
400-800 TABLET, FILM COATED ORAL EVERY 6 HOURS PRN
Status: DISCONTINUED | OUTPATIENT
Start: 2017-08-31 | End: 2017-09-02 | Stop reason: HOSPADM

## 2017-08-31 RX ORDER — EPHEDRINE SULFATE 50 MG/ML
5 INJECTION, SOLUTION INTRAMUSCULAR; INTRAVENOUS; SUBCUTANEOUS
Status: DISCONTINUED | OUTPATIENT
Start: 2017-08-31 | End: 2017-08-31 | Stop reason: CLARIF

## 2017-08-31 RX ORDER — FENTANYL CITRATE 50 UG/ML
100 INJECTION, SOLUTION INTRAMUSCULAR; INTRAVENOUS ONCE
Status: COMPLETED | OUTPATIENT
Start: 2017-08-31 | End: 2017-08-31

## 2017-08-31 RX ORDER — SODIUM CHLORIDE, SODIUM LACTATE, POTASSIUM CHLORIDE, CALCIUM CHLORIDE 600; 310; 30; 20 MG/100ML; MG/100ML; MG/100ML; MG/100ML
INJECTION, SOLUTION INTRAVENOUS CONTINUOUS
Status: DISCONTINUED | OUTPATIENT
Start: 2017-08-31 | End: 2017-08-31

## 2017-08-31 RX ORDER — NALOXONE HYDROCHLORIDE 0.4 MG/ML
.1-.4 INJECTION, SOLUTION INTRAMUSCULAR; INTRAVENOUS; SUBCUTANEOUS
Status: DISCONTINUED | OUTPATIENT
Start: 2017-08-31 | End: 2017-09-02 | Stop reason: HOSPADM

## 2017-08-31 RX ORDER — OXYTOCIN/0.9 % SODIUM CHLORIDE 30/500 ML
100-340 PLASTIC BAG, INJECTION (ML) INTRAVENOUS CONTINUOUS PRN
Status: DISCONTINUED | OUTPATIENT
Start: 2017-08-31 | End: 2017-08-31

## 2017-08-31 RX ORDER — ACETAMINOPHEN 325 MG/1
650 TABLET ORAL EVERY 4 HOURS PRN
Status: DISCONTINUED | OUTPATIENT
Start: 2017-08-31 | End: 2017-09-02 | Stop reason: HOSPADM

## 2017-08-31 RX ORDER — OXYTOCIN/0.9 % SODIUM CHLORIDE 30/500 ML
100 PLASTIC BAG, INJECTION (ML) INTRAVENOUS CONTINUOUS
Status: DISCONTINUED | OUTPATIENT
Start: 2017-08-31 | End: 2017-09-02 | Stop reason: HOSPADM

## 2017-08-31 RX ORDER — IBUPROFEN 800 MG/1
800 TABLET, FILM COATED ORAL
Status: DISCONTINUED | OUTPATIENT
Start: 2017-08-31 | End: 2017-08-31

## 2017-08-31 RX ORDER — LANOLIN 100 %
OINTMENT (GRAM) TOPICAL
Status: DISCONTINUED | OUTPATIENT
Start: 2017-08-31 | End: 2017-09-02 | Stop reason: HOSPADM

## 2017-08-31 RX ORDER — ONDANSETRON 2 MG/ML
4 INJECTION INTRAMUSCULAR; INTRAVENOUS EVERY 6 HOURS PRN
Status: DISCONTINUED | OUTPATIENT
Start: 2017-08-31 | End: 2017-08-31

## 2017-08-31 RX ADMIN — SENNOSIDES AND DOCUSATE SODIUM 1 TABLET: 8.6; 5 TABLET ORAL at 22:43

## 2017-08-31 RX ADMIN — OXYTOCIN 10 UNITS: 10 INJECTION, SOLUTION INTRAMUSCULAR; INTRAVENOUS at 15:22

## 2017-08-31 RX ADMIN — IBUPROFEN 800 MG: 400 TABLET ORAL at 16:39

## 2017-08-31 RX ADMIN — FENTANYL CITRATE 100 MCG: 50 INJECTION INTRAMUSCULAR; INTRAVENOUS at 12:44

## 2017-08-31 RX ADMIN — ACETAMINOPHEN 650 MG: 325 TABLET, FILM COATED ORAL at 18:56

## 2017-08-31 RX ADMIN — IBUPROFEN 800 MG: 400 TABLET ORAL at 22:43

## 2017-08-31 RX ADMIN — ACETAMINOPHEN 650 MG: 325 TABLET, FILM COATED ORAL at 23:40

## 2017-08-31 RX ADMIN — ONDANSETRON 4 MG: 2 INJECTION INTRAMUSCULAR; INTRAVENOUS at 10:01

## 2017-08-31 RX ADMIN — OXYTOCIN-SODIUM CHLORIDE 0.9% IV SOLN 30 UNIT/500ML 2 MILLI-UNITS/MIN: 30-0.9/5 SOLUTION at 09:26

## 2017-08-31 RX ADMIN — SODIUM CHLORIDE, POTASSIUM CHLORIDE, SODIUM LACTATE AND CALCIUM CHLORIDE: 600; 310; 30; 20 INJECTION, SOLUTION INTRAVENOUS at 09:28

## 2017-08-31 RX ADMIN — Medication: at 12:45

## 2017-08-31 NOTE — L&D DELIVERY NOTE
Dorota Perez is a 26 year old-year-old  female,  4 para 1 with EDC 17, who was admitted for preeclampsia at 36 weeks gestation with complaint of headache. Her prenatal care was at the [facility] in [place]. Prenatal course was complicated by history of GHTN prior pregnancy. Vaginal Group B Streptococcus culture was negative. Rh pos. Rubella immune. Hepatitis B non reactive.  The estimated fetal weight was 2818 gm by ultrasound on 17. Per discussion with perianatology, patient received steroids on  and  with cervidil cervical ripening overnight . On , oxytocin induction/augmentation was initiated per standard protocol for inadequate labor. Patient Did receive an epidural for pain relief.  The patient achieved complete dilation at 1445. She went on to deliver a 6 #, 8.4 oz female infant at 1511 by . Apgars were 8 at one minute and 9 at five minutes. There was one nuchal cord reduced prior to delivery of the body. The placenta delivered spontaneously and intact, with a 3 vessel cord. It was sent to pathology. The patient had an intact peritoneum.   QBL for the delivery was 140 mL.  Esha Correa MD

## 2017-08-31 NOTE — PROGRESS NOTES
LABOR NOTE    Subjective: Stable/unchanged headache. No vision changes or right upper quadrant pain.   Some cramping but no strong or uncomfortable contractions.    Objective:  /68  Temp 98.5  F (36.9  C) (Oral)  Resp 16  LMP 12/17/2016 (Exact Date)   FHT: 140, mod ludwig, accels  Sandwich: q >5 min, rare  SVE: 2/70/-2   Cervidil removed    Assessment and Plan:  -Severe preeclampsia: ongoing MIOL status post steroids. Stable headache. Will repeat labs today and update T&S (last was 8/28 at 3pm). Will start magnesium or worsened headache or any neurologic symptoms.   -Epidural prn

## 2017-08-31 NOTE — PROVIDER NOTIFICATION
08/31/17 1500   Provider Notification   Provider Name/Title Sunny   Method of Notification At Bedside   MD at bedside for delivery

## 2017-08-31 NOTE — ADDENDUM NOTE
Addendum  created 08/31/17 1423 by Candelario Jauregui DO    Anesthesia Event edited, Procedure Event Log accessed

## 2017-08-31 NOTE — PROVIDER NOTIFICATION
08/31/17 1220   Provider Notification   Provider Name/Title Jauregui   Method of Notification At Bedside   MDA at bedside for epidural, done without complications.

## 2017-08-31 NOTE — PROVIDER NOTIFICATION
08/31/17 0800   Provider Notification   Provider Name/Title Sunny   Method of Notification At Bedside   MD at bedside and cervidil removed. Ok with pt being off monitor to shower and walk. Will start pitocin in an hour.

## 2017-08-31 NOTE — PROGRESS NOTES
LABOR NOTE    Subjective: Reports some discomfort with contractions but not much.    Objective:  /72  Temp 97.8  F (36.6  C) (Oral)  Resp 16  LMP 12/17/2016 (Exact Date)   FHT: 140, mod ludwig, accels  Weir: q 2-4 min  SVE: 2/70/-1   AROM with clear fluid trickle     Assessment and Plan:  FHT category 1. Ruptured. Continued induction.

## 2017-08-31 NOTE — ANESTHESIA PREPROCEDURE EVALUATION
PAC NOTE:       ANESTHESIA PRE EVALUATION:  Anesthesia Evaluation       history and physical reviewed .             ROS/MED HX    ENT/Pulmonary:  - neg pulmonary ROS     Neurologic:  - neg neurologic ROS     Cardiovascular:  - neg cardiovascular ROS       METS/Exercise Tolerance:     Hematologic:         Musculoskeletal:         GI/Hepatic:  - neg GI/hepatic ROS       Renal/Genitourinary:         Endo:         Psychiatric:         Infectious Disease:         Malignancy:         Other:                     Physical Exam  Normal systems: cardiovascular, pulmonary and dental    Airway   Mallampati: II    Dental     Cardiovascular       Pulmonary     Other findings: .Lab Test        08/31/17 08/28/17                       0900          1504          WBC          12.2*        10.1          HGB          12.2         11.8          MCV          92           90            PLT          260          240            Lab Test        08/31/17 08/28/17                       0900          1504          CR           0.54         0.45*           neg OB ROS            Anesthesia Plan  Procedure only, no anesthetic delivered    History & Physical Review      ASA Status:  2 .  OB Epidural Asa: 2       Plan for Epidural          Postoperative Care      Consents  Anesthetic plan, risks, benefits and alternatives discussed with:  Patient..                            .

## 2017-08-31 NOTE — ANESTHESIA PROCEDURE NOTES
Peripheral nerve/Neuraxial procedure note : epidural catheter  Pre-Procedure  Performed by MIRI JAUREGUI  Referred by MD THERESA  Location: OB      Pre-Anesthestic Checklist: patient identified, IV checked, risks and benefits discussed, informed consent, monitors and equipment checked, pre-op evaluation and at physician/surgeon's request    Timeout  Correct Patient: Yes   Correct Procedure: Yes   Correct Site: Yes   Correct Laterality: N/A   Correct Position: Yes   Site Marked: N/A   .   Procedure Documentation    .    Procedure:    Epidural catheter.     .  .       Assessment/Narrative  .  .  . Comments:  .Diagnosis- Anticipated vaginal delivery    Continuous Labor Epidural, indication is for labor pain. Following an discussion of the expectations, benefits and risk (including but not limited to nerve damage, infection, bleeding, spinal headache, partial or failed block)--questions were encouraged and answered. The patient appears to understand, and consents to proceed.     The patient received a fluid bolus per orders    Time-out performed.     The patient was in the sitting position, a PVP prep times three was done in the lumbar area followed by placement of a sterile drape. The skin was then infiltrated with lidocaine. A 17ga Touhy needle was then advanced under a loss of resistance technique until the epidural space was identified. The epidural catheter was then advanced and secured. The catheter was then bolused in divided doses with Bupivicaine 0.25% 12 cc plus Fentanyl 100mcg (2cc), while the patient/fetus was monitored. Infusion orders written and infusion of 0.125% bupivicaine 15cc per hour started.    I or my partner, was immediately available and frequently monitored at necessary intervals.      MEGGAN Jauregui

## 2017-08-31 NOTE — PLAN OF CARE
Assumed cares after report received from Leonie Torrez RN. Will allow patient to rest. She has been instructed to call with any changes or needs. BP due at 0200. Continuous monitor strip visualized at desk.

## 2017-09-01 PROCEDURE — 12000029 ZZH R&B OB INTERMEDIATE

## 2017-09-01 PROCEDURE — 25000132 ZZH RX MED GY IP 250 OP 250 PS 637: Performed by: OBSTETRICS & GYNECOLOGY

## 2017-09-01 PROCEDURE — 40000083 ZZH STATISTIC IP LACTATION SERVICES 1-15 MIN

## 2017-09-01 RX ADMIN — ACETAMINOPHEN 650 MG: 325 TABLET, FILM COATED ORAL at 12:20

## 2017-09-01 RX ADMIN — ACETAMINOPHEN 650 MG: 325 TABLET, FILM COATED ORAL at 07:30

## 2017-09-01 RX ADMIN — IBUPROFEN 800 MG: 400 TABLET ORAL at 17:07

## 2017-09-01 RX ADMIN — SENNOSIDES AND DOCUSATE SODIUM 1 TABLET: 8.6; 5 TABLET ORAL at 08:47

## 2017-09-01 RX ADMIN — IBUPROFEN 800 MG: 400 TABLET ORAL at 04:17

## 2017-09-01 RX ADMIN — ACETAMINOPHEN 650 MG: 325 TABLET, FILM COATED ORAL at 20:27

## 2017-09-01 RX ADMIN — OXYCODONE HYDROCHLORIDE 5 MG: 5 TABLET ORAL at 20:26

## 2017-09-01 RX ADMIN — ACETAMINOPHEN 650 MG: 325 TABLET, FILM COATED ORAL at 17:08

## 2017-09-01 RX ADMIN — IBUPROFEN 800 MG: 400 TABLET ORAL at 10:10

## 2017-09-01 RX ADMIN — SENNOSIDES AND DOCUSATE SODIUM 1 TABLET: 8.6; 5 TABLET ORAL at 20:35

## 2017-09-01 NOTE — PLAN OF CARE
"Problem: Goal Outcome Summary  Goal: Goal Outcome Summary  Outcome: No Change  Pt's headache is still \"present\" per pt, declined further pharmacological interventions and oxycodone, agreed to try ice packs, clean wash cloth is brought to use with ice pack to the head; options and pain plan are reviewed with pt and spouse.      "

## 2017-09-01 NOTE — ANESTHESIA POSTPROCEDURE EVALUATION
Patient: Dorota Perez    * No procedures listed *    Diagnosis:* No pre-op diagnosis entered *  Diagnosis Additional Information: .Intrauterine Pregnancy, Labor      Anesthesia Type:  Epidural    Note:  Anesthesia Post Evaluation    Patient location during evaluation: Bedside       Comments: I or my partner was immediately available for management of this patient during epidural analgesia infusion.   Patient post labor epidural catheter, doing well.  She reports good pain relief with epidural catheter.  She denies ongoing sensorimotor block, headache, fever, chills or other complaints.  All questions answered, understanding voiced.  She will have us contacted for any questions or problems.        Last vitals:  Vitals:    08/31/17 1730 09/01/17 0138 09/01/17 0511   BP: 121/57 132/73 126/71   Resp:  18 18   Temp:  97.9  F (36.6  C) 97.9  F (36.6  C)         Electronically Signed By: Hayden Zeng MD  September 1, 2017  7:34 AM

## 2017-09-01 NOTE — LACTATION NOTE
Lactation visit. Baby is 36 1/2 weeks. Mom is pumping pc and has gotten 30 cc each time. She reports having a very good milk supply with her 1st baby. Baby had one low sugar and last one was hovering a little low. Baby NW then seems to do best when she tries for about 9 cc pc. Then the sugars have been better. Enc her to continue that pc until very stable sugars. Lactation to follow up tomorrow.

## 2017-09-01 NOTE — PLAN OF CARE
Problem: Goal Outcome Summary  Goal: Goal Outcome Summary  Outcome: Improving  Data: Vital signs within normal limits. Patient complains of severe headache. Postpartum checks within normal limits - see flow record. Patient eating and drinking normally. Patient able to empty bladder independently and is up ambulating. No apparent signs of infection. Patient performing self cares and is able to care for infant.  Action: Patient medicated during the shift for pain. See MAR. Patient reassessed within 1 hour after each medication and pain was improved - patient stated she was comfortable. Patient education done about postpartum cares. See flow record.  Response: Positive attachment behaviors observed with infant. Support persons Deandre present.   Plan: Anticipate discharge on 9/3/17.

## 2017-09-01 NOTE — PLAN OF CARE
Problem: Goal Outcome Summary  Goal: Goal Outcome Summary  Outcome: Improving  Pt. VSS, /73. Independent in infant and personal cares. Pain managed with tylenol and ibuprofen. Continues to complain of headache. Breastfeeding infant. Bonding well with infant.

## 2017-09-01 NOTE — PLAN OF CARE
Problem: Goal Outcome Summary  Goal: Goal Outcome Summary  Outcome: No Change  VSS, pt rates her headache high, taking ibuprofen and tylenol, ice packs are given and explained to pt on how to use them, denies blurred vision, epigastric pain, stable blood pressure, pleasant, happy, talked about further pain control, pt did not rest well yet, highly encouraged to rest, talked about privacy sign; breast feeding well and pumping had 25-30cc, finger feeding to her infant; updated plan of care with pt and spouse.

## 2017-09-01 NOTE — PROGRESS NOTES
[facility] Postpartum Note    S:  States she continues to have a HA but this has gotten better since delivery, rating 6/10 currently.  States she has been on many medications for the HA and does not believe one specific one worked best.  She prefers to monitor the HA now.  Denies visual changes, RUQ pain or N/V.  Minimal lochia.    O:  Blood pressure 117/67, pulse 64, temperature 97.7  F (36.5  C), temperature source Oral, resp. rate 16, last menstrual period 12/17/2016, unknown if currently breastfeeding.        Urine output adequate        Abdomen - Fundus firm, at umbilicus, nontender        Extremities - No calf tenderness    A:   Postpartum Day# 1, s/p Vaginal delivery, Severe Pre-eclampsia (HA) - doing well    P:  1)  Routine care, HA has improved since delivery and will monitor for now.  PIH labs normal and BP's stable.        2)  Breast feeding        3)  Anticipate discharge tomorrow    Diana Coburn,

## 2017-09-01 NOTE — PROGRESS NOTES
Pt stable upon transfer via wheel chair. Pt able to void before transfer, IV removed because of infiltration. Head ache continues but BP wdl. Report given to KALYANI Almanza at 1900. Bands double checked.

## 2017-09-02 VITALS
HEART RATE: 53 BPM | SYSTOLIC BLOOD PRESSURE: 130 MMHG | TEMPERATURE: 98 F | DIASTOLIC BLOOD PRESSURE: 74 MMHG | RESPIRATION RATE: 16 BRPM

## 2017-09-02 PROBLEM — O26.893 HEADACHE IN PREGNANCY, ANTEPARTUM, THIRD TRIMESTER: Status: RESOLVED | Noted: 2017-08-29 | Resolved: 2017-09-02

## 2017-09-02 PROBLEM — R51.9 HEADACHE IN PREGNANCY, ANTEPARTUM, THIRD TRIMESTER: Status: RESOLVED | Noted: 2017-08-29 | Resolved: 2017-09-02

## 2017-09-02 PROBLEM — Z86.69 HISTORY OF MIGRAINE DURING PREGNANCY: Status: RESOLVED | Noted: 2017-08-29 | Resolved: 2017-09-02

## 2017-09-02 PROBLEM — Z87.59 HISTORY OF MIGRAINE DURING PREGNANCY: Status: RESOLVED | Noted: 2017-08-29 | Resolved: 2017-09-02

## 2017-09-02 PROBLEM — O13.9 GESTATIONAL HYPERTENSION AFFECTING SECOND PREGNANCY: Status: RESOLVED | Noted: 2017-08-29 | Resolved: 2017-09-02

## 2017-09-02 PROCEDURE — 25000132 ZZH RX MED GY IP 250 OP 250 PS 637: Performed by: OBSTETRICS & GYNECOLOGY

## 2017-09-02 RX ORDER — IBUPROFEN 600 MG/1
600 TABLET, FILM COATED ORAL EVERY 6 HOURS PRN
Qty: 30 TABLET | Refills: 0 | Status: SHIPPED | OUTPATIENT
Start: 2017-09-02 | End: 2024-08-18

## 2017-09-02 RX ADMIN — ACETAMINOPHEN 650 MG: 325 TABLET, FILM COATED ORAL at 00:27

## 2017-09-02 RX ADMIN — IBUPROFEN 800 MG: 400 TABLET ORAL at 00:27

## 2017-09-02 RX ADMIN — ACETAMINOPHEN 650 MG: 325 TABLET, FILM COATED ORAL at 04:48

## 2017-09-02 RX ADMIN — SENNOSIDES AND DOCUSATE SODIUM 1 TABLET: 8.6; 5 TABLET ORAL at 08:31

## 2017-09-02 RX ADMIN — IBUPROFEN 800 MG: 400 TABLET ORAL at 08:30

## 2017-09-02 NOTE — PLAN OF CARE
Problem: Goal Outcome Summary  Goal: Goal Outcome Summary  Outcome: Improving  Q4 VSS, Bonding well with baby. Main pain symptom is a headache rated at a 7-8. Using tylenol, ibuprofen, icepack, and aromatherapy to improve symptoms. Denies other preeclamptic symptoms. No clonus present reflexes +2. Patient is voiding without difficulty and ambulating independently. Tolerating regular diet well. Independent in self and baby cares. Breastfeeding is going well. Mom is pumping following feeding and giving the infant the EBM. Infant is tolerating well. Will continue to monitor.

## 2017-09-02 NOTE — PLAN OF CARE
Pt discharging home with baby. Discharge instructions, follow up appointment, and discharge medications reviewed; pt verbalized understanding. No further questions at this time. ID bands verified.

## 2017-09-02 NOTE — DISCHARGE INSTRUCTIONS
Postpartum Vaginal Delivery Instructions  Follow up in clinic in 6 weeks for postpartum check.  Gerda Sommer Lactation: [phone]    Activity       Ask family and friends for help when you need it.    Do not place anything in your vagina for 6 weeks.    You are not restricted on other activities, but take it easy for a few weeks to allow your body to recover from delivery.  You are able to do any activities you feel up to that point.    No driving until you have stopped taking your pain medications (usually two weeks after delivery).     Call your health care provider if you have any of these symptoms:       Increased pain, swelling, redness, or fluid around your stiches from an episiotomy or perineal tear.    A fever above 100.4 F (38 C) with or without chills when placing a thermometer under your tongue.    You soak a sanitary pad with blood within 1 hour, or you see blood clots larger than a golf ball.    Bleeding that lasts more than 6 weeks.    Vaginal discharge that smells bad.    Severe pain, cramping or tenderness in your lower belly area.    A need to urinate more frequently (use the toilet more often), more urgently (use the toilet very quickly), or it burns when you urinate.    Nausea and vomiting.    Redness, swelling or pain around a vein in your leg.    Problems breastfeeding or a red or painful area on your breast.    Chest pain and cough or are gasping for air.    Problems coping with sadness, anxiety, or depression.  If you have any concerns about hurting yourself or the baby, call your provider immediately.     You have questions or concerns after you return home.     Keep your hands clean:  Always wash your hands before touching your perineal area and stitches.  This helps reduce your risk of infection.  If your hands aren't dirty, you may use an alcohol hand-rub to clean your hands. Keep your nails clean and short.

## 2017-09-02 NOTE — PLAN OF CARE
"Problem: Goal Outcome Summary  Goal: Goal Outcome Summary  Outcome: Adequate for Discharge Date Met:  09/02/17  Pt is meeting goals, discharge education is completed, pt knows about follow up with ob, states her headache at \"comfort\" and discharging to home with her infant, no further questions.      "

## 2017-09-02 NOTE — PLAN OF CARE
Patient is currently rating her pain a 7/10 which is a decrease. Her demeanor is very different - more talkative and pleasant; smiling. Encouraged her to take 2 oxycodone when it is next available. She has not had much sleep at all which would probably help her. She was encouraged to try and rest - she feels she will get some sleep when baby goes to the nursery for the car seat test. She is also using her own aromatherapy.

## 2017-09-02 NOTE — DISCHARGE SUMMARY
[facility] Postpartum/Discharge Note    S:  Patient without complaints.  Minimal lochia.    O:  Blood pressure 129/74, pulse 58, temperature 98.3  F (36.8  C), temperature source Oral, resp. rate 16, last menstrual period 12/17/2016, unknown if currently breastfeeding.        Urine output adequate        Abdomen - Fundus firm, at umbilicus, nontender        Extremities - No calf tenderness    A:   Postpartum Day# 2, s/p Vaginal delivery - doing well        Severe pre-eclampsia - resolving, no sx's, BPs stable    P:  1)  Discharge home        2)  F/U 6 weeks w/ Primary OB        3)  Discharge meds: Ponce Reno MD

## 2017-09-05 LAB — COPATH REPORT: NORMAL

## 2017-09-25 ENCOUNTER — APPOINTMENT (OUTPATIENT)
Dept: ULTRASOUND IMAGING | Facility: CLINIC | Age: 27
End: 2017-09-25
Attending: EMERGENCY MEDICINE
Payer: COMMERCIAL

## 2017-09-25 ENCOUNTER — HOSPITAL ENCOUNTER (EMERGENCY)
Facility: CLINIC | Age: 27
Discharge: HOME | End: 2017-09-25
Attending: EMERGENCY MEDICINE | Admitting: EMERGENCY MEDICINE
Payer: COMMERCIAL

## 2017-09-25 VITALS
TEMPERATURE: 97.3 F | SYSTOLIC BLOOD PRESSURE: 110 MMHG | BODY MASS INDEX: 25.89 KG/M2 | RESPIRATION RATE: 16 BRPM | OXYGEN SATURATION: 97 % | DIASTOLIC BLOOD PRESSURE: 77 MMHG | WEIGHT: 146.16 LBS | HEART RATE: 75 BPM

## 2017-09-25 DIAGNOSIS — K80.50 BILIARY COLIC: ICD-10-CM

## 2017-09-25 LAB
ALBUMIN SERPL-MCNC: 3.4 G/DL (ref 3.4–5)
ALBUMIN UR-MCNC: NEGATIVE MG/DL
ALP SERPL-CCNC: 101 U/L (ref 40–150)
ALT SERPL W P-5'-P-CCNC: 81 U/L (ref 0–50)
AMORPH CRY #/AREA URNS HPF: ABNORMAL /HPF
ANION GAP SERPL CALCULATED.3IONS-SCNC: 5 MMOL/L (ref 3–14)
APPEARANCE UR: ABNORMAL
AST SERPL W P-5'-P-CCNC: 94 U/L (ref 0–45)
BASOPHILS # BLD AUTO: 0.1 10E9/L (ref 0–0.2)
BASOPHILS NFR BLD AUTO: 0.8 %
BILIRUB SERPL-MCNC: 0.7 MG/DL (ref 0.2–1.3)
BILIRUB UR QL STRIP: NEGATIVE
BUN SERPL-MCNC: 12 MG/DL (ref 7–30)
CALCIUM SERPL-MCNC: 9.2 MG/DL (ref 8.5–10.1)
CHLORIDE SERPL-SCNC: 105 MMOL/L (ref 94–109)
CO2 SERPL-SCNC: 27 MMOL/L (ref 20–32)
COLOR UR AUTO: YELLOW
CREAT SERPL-MCNC: 0.68 MG/DL (ref 0.52–1.04)
DIFFERENTIAL METHOD BLD: ABNORMAL
EOSINOPHIL # BLD AUTO: 0.2 10E9/L (ref 0–0.7)
EOSINOPHIL NFR BLD AUTO: 1.3 %
ERYTHROCYTE [DISTWIDTH] IN BLOOD BY AUTOMATED COUNT: 12.7 % (ref 10–15)
GFR SERPL CREATININE-BSD FRML MDRD: >90 ML/MIN/1.7M2
GLUCOSE SERPL-MCNC: 100 MG/DL (ref 70–99)
GLUCOSE UR STRIP-MCNC: NEGATIVE MG/DL
HCT VFR BLD AUTO: 40.4 % (ref 35–47)
HGB BLD-MCNC: 13.4 G/DL (ref 11.7–15.7)
HGB UR QL STRIP: NEGATIVE
IMM GRANULOCYTES # BLD: 0.1 10E9/L (ref 0–0.4)
IMM GRANULOCYTES NFR BLD: 0.4 %
KETONES UR STRIP-MCNC: NEGATIVE MG/DL
LEUKOCYTE ESTERASE UR QL STRIP: NEGATIVE
LIPASE SERPL-CCNC: 335 U/L (ref 73–393)
LYMPHOCYTES # BLD AUTO: 2.3 10E9/L (ref 0.8–5.3)
LYMPHOCYTES NFR BLD AUTO: 17.2 %
MCH RBC QN AUTO: 29.7 PG (ref 26.5–33)
MCHC RBC AUTO-ENTMCNC: 33.2 G/DL (ref 31.5–36.5)
MCV RBC AUTO: 90 FL (ref 78–100)
MONOCYTES # BLD AUTO: 0.7 10E9/L (ref 0–1.3)
MONOCYTES NFR BLD AUTO: 5.5 %
MUCOUS THREADS #/AREA URNS LPF: PRESENT /LPF
NEUTROPHILS # BLD AUTO: 10.1 10E9/L (ref 1.6–8.3)
NEUTROPHILS NFR BLD AUTO: 74.8 %
NITRATE UR QL: NEGATIVE
NRBC # BLD AUTO: 0 10*3/UL
NRBC BLD AUTO-RTO: 0 /100
PH UR STRIP: 7 PH (ref 5–7)
PLATELET # BLD AUTO: 262 10E9/L (ref 150–450)
POTASSIUM SERPL-SCNC: 3.6 MMOL/L (ref 3.4–5.3)
PROT SERPL-MCNC: 6.8 G/DL (ref 6.8–8.8)
RBC # BLD AUTO: 4.51 10E12/L (ref 3.8–5.2)
RBC #/AREA URNS AUTO: 1 /HPF (ref 0–2)
SODIUM SERPL-SCNC: 137 MMOL/L (ref 133–144)
SOURCE: ABNORMAL
SP GR UR STRIP: 1.02 (ref 1–1.03)
SQUAMOUS #/AREA URNS AUTO: 1 /HPF (ref 0–1)
UROBILINOGEN UR STRIP-MCNC: 0 MG/DL (ref 0–2)
WBC # BLD AUTO: 13.6 10E9/L (ref 4–11)
WBC #/AREA URNS AUTO: 2 /HPF (ref 0–2)

## 2017-09-25 PROCEDURE — 85025 COMPLETE CBC W/AUTO DIFF WBC: CPT | Performed by: EMERGENCY MEDICINE

## 2017-09-25 PROCEDURE — 99285 EMERGENCY DEPT VISIT HI MDM: CPT | Mod: 25

## 2017-09-25 PROCEDURE — 96374 THER/PROPH/DIAG INJ IV PUSH: CPT

## 2017-09-25 PROCEDURE — 96375 TX/PRO/DX INJ NEW DRUG ADDON: CPT

## 2017-09-25 PROCEDURE — 80053 COMPREHEN METABOLIC PANEL: CPT | Performed by: EMERGENCY MEDICINE

## 2017-09-25 PROCEDURE — 81001 URINALYSIS AUTO W/SCOPE: CPT | Performed by: EMERGENCY MEDICINE

## 2017-09-25 PROCEDURE — 25000128 H RX IP 250 OP 636: Performed by: EMERGENCY MEDICINE

## 2017-09-25 PROCEDURE — 83690 ASSAY OF LIPASE: CPT | Performed by: EMERGENCY MEDICINE

## 2017-09-25 PROCEDURE — 76705 ECHO EXAM OF ABDOMEN: CPT

## 2017-09-25 RX ORDER — ONDANSETRON 2 MG/ML
4 INJECTION INTRAMUSCULAR; INTRAVENOUS
Status: COMPLETED | OUTPATIENT
Start: 2017-09-25 | End: 2017-09-25

## 2017-09-25 RX ORDER — OXYCODONE AND ACETAMINOPHEN 5; 325 MG/1; MG/1
1-2 TABLET ORAL EVERY 4 HOURS PRN
Qty: 15 TABLET | Refills: 0 | Status: SHIPPED | OUTPATIENT
Start: 2017-09-25 | End: 2024-08-18

## 2017-09-25 RX ORDER — MORPHINE SULFATE 4 MG/ML
4 INJECTION, SOLUTION INTRAMUSCULAR; INTRAVENOUS
Status: DISCONTINUED | OUTPATIENT
Start: 2017-09-25 | End: 2017-09-25 | Stop reason: HOSPADM

## 2017-09-25 RX ORDER — ONDANSETRON 4 MG/1
4 TABLET, ORALLY DISINTEGRATING ORAL EVERY 8 HOURS PRN
Qty: 10 TABLET | Refills: 0 | Status: SHIPPED | OUTPATIENT
Start: 2017-09-25 | End: 2017-09-28

## 2017-09-25 RX ADMIN — ONDANSETRON 4 MG: 2 INJECTION INTRAMUSCULAR; INTRAVENOUS at 02:24

## 2017-09-25 RX ADMIN — MORPHINE SULFATE 4 MG: 4 INJECTION, SOLUTION INTRAMUSCULAR; INTRAVENOUS at 02:30

## 2017-09-25 ASSESSMENT — ENCOUNTER SYMPTOMS
DIARRHEA: 0
NAUSEA: 0
VOMITING: 0
ABDOMINAL PAIN: 1
FEVER: 0

## 2017-09-25 NOTE — ED PROVIDER NOTES
History     Chief Complaint:  Pain     HPI   Dorota Perez is a 26 year old female who presents for evaluation of a sudden onset of upper abdominal pain that wraps around to her back. The patient recently had a baby three weeks ago with no complications other than preeclampsia. The patient reports that her symptoms started around midnight when she woke up. The patient has tried to take Tums and Zantac, which did not help her pain. She has had no nausea, vomiting, diarrhea. She reports no changes in diet and that she has had a normal appetite. The patient had a normal day otherwise yesterday.     Allergies:  No known drug allergies.      Medications:    The patient is currently on no regular medications.     Past Medical History:    Preeclampsia     Past Surgical History:    Appendectomy     Family History:    Hypertension--Mother, Father     Social History:  Marital Status:    Presents to the ED with    Tobacco Use: Former smoker  Alcohol Use: Rare    Review of Systems   Constitutional: Negative for fever.   Gastrointestinal: Positive for abdominal pain. Negative for diarrhea, nausea and vomiting.   All other systems reviewed and are negative.    Physical Exam   First Vitals:  BP: 109/75  Heart Rate: 79  Temp: 97.3  F (36.3  C)  Resp: 18  Weight: 66.3 kg (146 lb 2.6 oz)      Physical Exam  General: Adult female sitting upright on bed.   Eyes: PERRL, Conjunctive within normal limits. No scleral icterus.   ENT: Moist mucous membranes, oropharynx clear.   CV: Normal S1S2, no murmur, rub or gallop. Regular rate and rhythm  Resp: Clear to auscultation bilaterally, no wheezes, rales or rhonchi. Normal respiratory effort.  GI: Abdomen is soft and nondistended. Tender with voluntary guarding in the right upper quadrant and epigastrium.  No palpable masses. No rebound.  MSK: No edema. Nontender. Normal active range of motion.  Skin: Warm and dry. No rashes or lesions or ecchymoses on visible skin.  Neuro:  Alert and oriented. Responds appropriately to all questions and commands. No focal findings appreciated. Normal muscle tone.  Psych: Normal mood and affect. Pleasant.     Emergency Department Course   Imaging:  US Abdomen Limited  Cholelithiasis. There is no biliary dilatation or evidence  of cholecystitis.    Radiographic findings were communicated with the patient who voiced understanding of the findings.    Laboratory:  Blood:  CMP: ALT 81, AST 94, otherwise WNL (Creatinine 0.68)   CBC:  WBC 13.6, HGB 13.4, , otherwise WNL   Lipase: 335    Urine:  UA: Slightly cloudy yellow urine, mucous present, Moderate amorphous crystals present, otherwise WNL     Interventions:  Medications   morphine (PF) injection 4 mg (4 mg Intravenous Given 9/25/17 0230)   ondansetron (ZOFRAN) injection 4 mg (4 mg Intravenous Given 9/25/17 0224)     Emergency Department Course:  Nursing notes and vitals reviewed.  I performed an exam of the patient as documented above.  A peripheral IV was established. Blood was drawn from the patient. This was sent for laboratory testing, findings above.    Urine sample was obtained and sent for laboratory analysis, findings above.   The patient was sent for an abdominal ultrasound  while in the emergency department, findings above.  Findings and plan explained to the patient. Patient discharged home with instructions regarding supportive care, medications, and reasons to return. The importance of close follow-up was reviewed. The patient was prescribed Percocet and Zofran.   I personally reviewed the laboratory results with the patient and answered all related questions prior to discharge.       Impression & Plan      Medical Decision Making:  Dorota Perez is a 26 year old female who presents for evaluation of abdominal pain in the right upper quadrant.  This patient has symptoms consistent with gallstones and biliary colic.  Ultrasound has confirmed the presence of gallstones.  There is no  clinical, laboratory, or ultrasound evidence of choledocholithiasis, gallstone pancreatitis, or ascending cholangitis.  I doubt perforated ulcer, diverticulitis, colitis.  Certainly also may have component of GERD or gastritis.  The patient will be prescribed analgesics. The patient was educated to avoid fatty foods.  The patient was told to return to ED for increasing pain, vomiting, chills, sweats, or fever.  Follow up with general surgery is indicated for outpatient consultation, this may be arranged as soon as able.  See primary care doctor this week for recheck.       Diagnosis:    ICD-10-CM   1. Biliary colic K80.50     Disposition:  discharged to home    Discharge Medications:  Discharge Medication List as of 9/25/2017  4:40 AM      START taking these medications    Details   oxyCODONE-acetaminophen (PERCOCET) 5-325 MG per tablet Take 1-2 tablets by mouth every 4 hours as needed for pain, Disp-15 tablet, R-0, Local Print      ondansetron (ZOFRAN ODT) 4 MG ODT tab Take 1 tablet (4 mg) by mouth every 8 hours as needed for nausea, Disp-10 tablet, R-0, Local Print           Scribe disclosure:  I, Miller Melgar, am serving as a scribe on 9/25/2017 at 2:12 AM to personally document services performed by Dr. Ott based on my observations and the provider's statements to me.    [facility] EMERGENCY DEPARTMENT       Xuan Ott MD  10/01/17 7716

## 2017-09-25 NOTE — ED NOTES
Patient alert and oriented times 3 .  Abc intact  Had a baby 3 weeks ago . Under breast pain both sides pain started. Across upper abdomen

## 2017-09-25 NOTE — ED AVS SNAPSHOT
[facility] Emergency Department    [address]    [facility] [address]    Phone:  [phone]    Fax:  [phone]                                       Dorota Perez   MRN: [id number]    Department:  [facility] Emergency Department   Date of Visit:  9/25/2017           After Visit Summary Signature Page     I have received my discharge instructions, and my questions have been answered. I have discussed any challenges I see with this plan with the nurse or doctor.    ..........................................................................................................................................  Patient/Patient Representative Signature      ..........................................................................................................................................  Patient Representative Print Name and Relationship to Patient    ..................................................               ................................................  Date                                            Time    ..........................................................................................................................................  Reviewed by Signature/Title    ...................................................              ..............................................  Date                                                            Time

## 2017-09-25 NOTE — ED AVS SNAPSHOT
[facility] Emergency Department    [address]    [facility] [address]    Phone:  [phone]    Fax:  [phone]                                       Dorota Perez   MRN: [id number]    Department:  [facility] Emergency Department   Date of Visit:  9/25/2017           Patient Information     Date Of Birth          1990        Your diagnoses for this visit were:     Biliary colic        You were seen by Xuan Ott MD.      Follow-up Information     Follow up with Primary clinic.    Why:  within 2-3 days for reassessment        Follow up with [facility] Emergency Department.    Specialty:  EMERGENCY MEDICINE    Why:  As needed, If symptoms worsen    Contact information:    [address]  [facility] [address] [phone]        Discharge Instructions         Gallstones with Biliary Colic    You have abdominal pain due to irritation and spasm of the gallbladder. This is called biliary colic. The gallbladder is a small sac under the liver, which stores and releases a fluid that aids in the digestion of fat. A collection of crystals may form stones inside the gallbladder (gallstones). Gallstones can cause the gallbladder to spasm. If they block the duct out of the gallbladder, they can cause pain and even an infection.   A number of factors increase the risk for having gallstones:    Being female    Being severely overweight (obese)    Older age    Losing or gaining weight quickly    Eating a high-calorie diet    Being pregnant    Taking hormone therapy    Having diabetes  Home care    Rest in bed.    Drink only clear liquids until you feel better.    You may have been prescribed medicine for pain or nausea. Take these as directed.    Fat in your diet makes the gallbladder contract and may cause increased pain. Avoid foods that are high in fat (such as full-fat dairy, fried foods, and fatty meats) for at least two days.    If  you are overweight, talk to your healthcare provider about losing weight.  Follow-up care  Follow up with your healthcare provider or as advised. There is a chance that you will have another episode of pain from your gallstones at some point. Removal of the gallbladder is an option to prevent this. Talk with your healthcare provider about your treatment options.  When to seek medical advice  Call your healthcare provider if any of the following occur:    Worsening pain or pain lasting for longer than 6 hours    Pain moving to the right lower abdomen    Repeated vomiting    Swollen abdomen    Fever of 100.4 F (38 C) or higher, or as directed by your healthcare provider    Very dark urine, light colored stools, or yellow color of the skin or eyes    Chest, arm, back, neck or jaw pain  Date Last Reviewed: 6/18/2015 2000-2017 [employer]. [address] [place] [address]. All rights reserved. This information is not intended as a substitute for professional medical care. Always follow your healthcare professional's instructions.          24 Hour Appointment Hotline       To make an appointment at any [facility], call [phone] ([phone]). If you don't have a family doctor or clinic, we will help you find one. [facility] are conveniently located to serve the needs of you and your family.             Review of your medicines      START taking        Dose / Directions Last dose taken    ondansetron 4 MG ODT tab   Commonly known as:  ZOFRAN ODT   Dose:  4 mg   Quantity:  10 tablet        Take 1 tablet (4 mg) by mouth every 8 hours as needed for nausea   Refills:  0        oxyCODONE-acetaminophen 5-325 MG per tablet   Commonly known as:  PERCOCET   Dose:  1-2 tablet   Quantity:  15 tablet        Take 1-2 tablets by mouth every 4 hours as needed for pain   Refills:  0          Our records show that you are taking the medicines listed below. If these are incorrect, please call  your family doctor or clinic.        Dose / Directions Last dose taken    ibuprofen 600 MG tablet   Commonly known as:  ADVIL/MOTRIN   Dose:  600 mg   Quantity:  30 tablet        Take 1 tablet (600 mg) by mouth every 6 hours as needed for pain Take w/ food   Refills:  0        prenatal multivitamin plus iron 27-0.8 MG Tabs per tablet   Dose:  1 tablet        Take 1 tablet by mouth daily   Refills:  0                Prescriptions were sent or printed at these locations (2 Prescriptions)                   Other Prescriptions                Printed at Department/Unit printer (2 of 2)         oxyCODONE-acetaminophen (PERCOCET) 5-325 MG per tablet               ondansetron (ZOFRAN ODT) 4 MG ODT tab                Procedures and tests performed during your visit     CBC with platelets differential    Comprehensive metabolic panel    Lipase    UA with Microscopic    US Abdomen Limited      Orders Needing Specimen Collection     None      Pending Results     Date and Time Order Name Status Description    9/25/2017 0215 US Abdomen Limited Preliminary             Pending Culture Results     No orders found from 9/23/2017 to 9/26/2017.            Pending Results Instructions     If you had any lab results that were not finalized at the time of your Discharge, you can call the ED Lab Result RN at [phone]. You will be contacted by this team for any positive Lab results or changes in treatment. The nurses are available 7 days a week from 10A to 6:30P.  You can leave a message 24 hours per day and they will return your call.        Test Results From Your Hospital Stay        9/25/2017  2:25 AM      Component Results     Component Value Ref Range & Units Status    WBC 13.6 (H) 4.0 - 11.0 10e9/L Final    RBC Count 4.51 3.8 - 5.2 10e12/L Final    Hemoglobin 13.4 11.7 - 15.7 g/dL Final    Hematocrit 40.4 35.0 - 47.0 % Final    MCV 90 78 - 100 fl Final    MCH 29.7 26.5 - 33.0 pg Final    MCHC 33.2 31.5 - 36.5 g/dL Final    RDW  12.7 10.0 - 15.0 % Final    Platelet Count 262 150 - 450 10e9/L Final    Diff Method Automated Method  Final    % Neutrophils 74.8 % Final    % Lymphocytes 17.2 % Final    % Monocytes 5.5 % Final    % Eosinophils 1.3 % Final    % Basophils 0.8 % Final    % Immature Granulocytes 0.4 % Final    Nucleated RBCs 0 0 /100 Final    Absolute Neutrophil 10.1 (H) 1.6 - 8.3 10e9/L Final    Absolute Lymphocytes 2.3 0.8 - 5.3 10e9/L Final    Absolute Monocytes 0.7 0.0 - 1.3 10e9/L Final    Absolute Eosinophils 0.2 0.0 - 0.7 10e9/L Final    Absolute Basophils 0.1 0.0 - 0.2 10e9/L Final    Abs Immature Granulocytes 0.1 0 - 0.4 10e9/L Final    Absolute Nucleated RBC 0.0  Final         9/25/2017  2:42 AM      Component Results     Component Value Ref Range & Units Status    Sodium 137 133 - 144 mmol/L Final    Potassium 3.6 3.4 - 5.3 mmol/L Final    Chloride 105 94 - 109 mmol/L Final    Carbon Dioxide 27 20 - 32 mmol/L Final    Anion Gap 5 3 - 14 mmol/L Final    Glucose 100 (H) 70 - 99 mg/dL Final    Urea Nitrogen 12 7 - 30 mg/dL Final    Creatinine 0.68 0.52 - 1.04 mg/dL Final    GFR Estimate >90 >60 mL/min/1.7m2 Final    Non  GFR Calc    GFR Estimate If Black >90 >60 mL/min/1.7m2 Final    African American GFR Calc    Calcium 9.2 8.5 - 10.1 mg/dL Final    Bilirubin Total 0.7 0.2 - 1.3 mg/dL Final    Albumin 3.4 3.4 - 5.0 g/dL Final    Protein Total 6.8 6.8 - 8.8 g/dL Final    Alkaline Phosphatase 101 40 - 150 U/L Final    ALT 81 (H) 0 - 50 U/L Final    AST 94 (H) 0 - 45 U/L Final         9/25/2017  2:42 AM      Component Results     Component Value Ref Range & Units Status    Lipase 335 73 - 393 U/L Final         9/25/2017  2:18 AM      Component Results     Component Value Ref Range & Units Status    Color Urine Yellow  Final    Appearance Urine Slightly Cloudy  Final    Glucose Urine Negative NEG^Negative mg/dL Final    Bilirubin Urine Negative NEG^Negative Final    Ketones Urine Negative NEG^Negative mg/dL Final     Specific Gravity Urine 1.020 1.003 - 1.035 Final    Blood Urine Negative NEG^Negative Final    pH Urine 7.0 5.0 - 7.0 pH Final    Protein Albumin Urine Negative NEG^Negative mg/dL Final    Urobilinogen mg/dL 0.0 0.0 - 2.0 mg/dL Final    Nitrite Urine Negative NEG^Negative Final    Leukocyte Esterase Urine Negative NEG^Negative Final    Source Midstream Urine  Final    WBC Urine 2 0 - 2 /HPF Final    RBC Urine 1 0 - 2 /HPF Final    Squamous Epithelial /HPF Urine 1 0 - 1 /HPF Final    Mucous Urine Present (A) NEG^Negative /LPF Final    Amorphous Crystals Moderate (A) NEG^Negative /HPF Final         9/25/2017  3:53 AM      Narrative     US ABDOMEN LIMITED  9/25/2017 3:39 AM      HISTORY: Right upper quadrant pain.     COMPARISON: None.    FINDINGS: The liver is normal in size and texture without focal mass.  There is no intra or extrahepatic biliary dilatation. The common  hepatic duct measures 0.7 cm. There are stones in the gallbladder.  There is a positive sonographic Ordonez sign. The gallbladder appears  normal. The pancreas head and body appear normal. The tail is obscured  by bowel gas. The right kidney measures 11.0 cm and is normal in  appearance. The proximal abdominal aorta and IVC appear normal.         Impression     IMPRESSION: Cholelithiasis. There is no biliary dilatation or evidence  of cholecystitis.                Clinical Quality Measure: Blood Pressure Screening     Your blood pressure was checked while you were in the emergency department today. The last reading we obtained was  BP: 110/71 . Please read the guidelines below about what these numbers mean and what you should do about them.  If your systolic blood pressure (the top number) is less than 120 and your diastolic blood pressure (the bottom number) is less than 80, then your blood pressure is normal. There is nothing more that you need to do about it.  If your systolic blood pressure (the top number) is 120-139 or your diastolic blood  "pressure (the bottom number) is 80-89, your blood pressure may be higher than it should be. You should have your blood pressure rechecked within a year by a primary care provider.  If your systolic blood pressure (the top number) is 140 or greater or your diastolic blood pressure (the bottom number) is 90 or greater, you may have high blood pressure. High blood pressure is treatable, but if left untreated over time it can put you at risk for heart attack, stroke, or kidney failure. You should have your blood pressure rechecked by a primary care provider within the next 4 weeks.  If your provider in the emergency department today gave you specific instructions to follow-up with your doctor or provider even sooner than that, you should follow that instruction and not wait for up to 4 weeks for your follow-up visit.        Thank you for choosing [facility]       Thank you for choosing [facility] for your care. Our goal is always to provide you with excellent care. Hearing back from our patients is one way we can continue to improve our services. Please take a few minutes to complete the written survey that you may receive in the mail after you visit with us. Thank you!        [facility] Information     [facility] lets you send messages to your doctor, view your test results, renew your prescriptions, schedule appointments and more. To sign up, go to [email or web address] . Click on \"Log in\" on the left side of the screen, which will take you to the Welcome page. Then click on \"Sign up Now\" on the right side of the page.     You will be asked to enter the access code listed below, as well as some personal information. Please follow the directions to create your username and password.     Your access code is: [email or web address]  Expires: 2017  8:20 AM     Your access code will  in 90 days. If you need help or a new code, please call your [facility] clinic or [phone].        Care EveryWhere ID     This is your Care " EveryWhere ID. This could be used by other organizations to access your [facility] medical records  [id number]        Equal Access to Services     NICHO MANDUJANO : Escobar Pearson, manas wade, aleah buckner, ekaterina persaud. So [facility] [phone].    ATENCIÓN: Si habla español, tiene a giron disposición servicios gratuitos de asistencia lingüística. Llame al [phone].    We comply with applicable federal civil rights laws and [place] laws. We do not discriminate on the basis of race, color, national origin, age, disability sex, sexual orientation or gender identity.            After Visit Summary       This is your record. Keep this with you and show to your community pharmacist(s) and doctor(s) at your next visit.

## 2017-09-25 NOTE — DISCHARGE INSTRUCTIONS
Gallstones with Biliary Colic    You have abdominal pain due to irritation and spasm of the gallbladder. This is called biliary colic. The gallbladder is a small sac under the liver, which stores and releases a fluid that aids in the digestion of fat. A collection of crystals may form stones inside the gallbladder (gallstones). Gallstones can cause the gallbladder to spasm. If they block the duct out of the gallbladder, they can cause pain and even an infection.   A number of factors increase the risk for having gallstones:    Being female    Being severely overweight (obese)    Older age    Losing or gaining weight quickly    Eating a high-calorie diet    Being pregnant    Taking hormone therapy    Having diabetes  Home care    Rest in bed.    Drink only clear liquids until you feel better.    You may have been prescribed medicine for pain or nausea. Take these as directed.    Fat in your diet makes the gallbladder contract and may cause increased pain. Avoid foods that are high in fat (such as full-fat dairy, fried foods, and fatty meats) for at least two days.    If you are overweight, talk to your healthcare provider about losing weight.  Follow-up care  Follow up with your healthcare provider or as advised. There is a chance that you will have another episode of pain from your gallstones at some point. Removal of the gallbladder is an option to prevent this. Talk with your healthcare provider about your treatment options.  When to seek medical advice  Call your healthcare provider if any of the following occur:    Worsening pain or pain lasting for longer than 6 hours    Pain moving to the right lower abdomen    Repeated vomiting    Swollen abdomen    Fever of 100.4 F (38 C) or higher, or as directed by your healthcare provider    Very dark urine, light colored stools, or yellow color of the skin or eyes    Chest, arm, back, neck or jaw pain  Date Last Reviewed: 6/18/2015 2000-2017 [employer] [address] [place] [address]. All rights reserved. This information is not intended as a substitute for professional medical care. Always follow your healthcare professional's instructions.

## 2020-03-10 ENCOUNTER — HEALTH MAINTENANCE LETTER (OUTPATIENT)
Age: 30
End: 2020-03-10

## 2020-07-16 ENCOUNTER — NURSE TRIAGE (OUTPATIENT)
Dept: NURSING | Facility: CLINIC | Age: 30
End: 2020-07-16

## 2020-07-16 DIAGNOSIS — Z11.59 SCREENING FOR VIRAL DISEASE: Primary | ICD-10-CM

## 2020-07-16 NOTE — TELEPHONE ENCOUNTER
"Patient is calling requesting COVID serologic antibody testing.  NOTE: Serologic testing is a blood test for 'antibodies' which are made at 10-14 days after you have had symptoms of COVID or were exposed and had an asymptomatic infection.  This does NOT test you for 'active' infection or tell you if you are contagious.    Are you a healthcare worker?  No  Do you currently have a cough, fever, body aches, shortness of breath, or difficulty breathing?  No  Did you previously have cough, fever, body aches, shortness of breath, or difficulty breathing that have now resolved? Has had previous covid symptoms.   Symptoms began January 2020  Symptoms started > 14 days ago. Lab order placed per SARS-CoV-2 Serology test Standing Order using indication \"Previously symptomatic >14d since onset, currently asymptomatic\" and diagnosis code \"Screening for viral disease\" (Z11.59)      The patient was informed: \"Testing is limited each day and it may take time for testing to be available to everyone who has called. You will receive a call within 48-72 hours to schedule the serology testing. Please confirm the best number to reach you is [phone]. If you have any questions about scheduling, call [phone].\"     Donna Mota RN  "

## 2020-07-28 ENCOUNTER — NURSE TRIAGE (OUTPATIENT)
Dept: NURSING | Facility: CLINIC | Age: 30
End: 2020-07-28

## 2020-07-28 NOTE — TELEPHONE ENCOUNTER
Patient calling to inquire about antibody testing for her and her 2 year old child.    States she called last week and was told someone would call to help her schedule but she has not had a call.    Referred patient to retail scheduling site. She will schedule tests.    Protocol and care advice reviewed  Caller states understanding of the recommended disposition    Advised to call back if further questions or concerns      Additional Information    Negative: RN needs further essential information from caller in order to complete triage    Negative: Requesting regular office appointment    Negative: [1] Caller requesting NON-URGENT health information AND [2] PCP's office is the best resource    General information question, no triage required and triager able to answer question    Negative: Health Information question, no triage required and triager able to answer question    Protocols used: INFORMATION ONLY CALL-A-

## 2020-10-31 ENCOUNTER — APPOINTMENT (OUTPATIENT)
Dept: CT IMAGING | Facility: CLINIC | Age: 30
End: 2020-10-31
Attending: EMERGENCY MEDICINE
Payer: COMMERCIAL

## 2020-10-31 ENCOUNTER — HOSPITAL ENCOUNTER (EMERGENCY)
Facility: CLINIC | Age: 30
Discharge: HOME | End: 2020-11-01
Attending: EMERGENCY MEDICINE | Admitting: EMERGENCY MEDICINE
Payer: COMMERCIAL

## 2020-10-31 DIAGNOSIS — S09.90XA CLOSED HEAD INJURY, INITIAL ENCOUNTER: ICD-10-CM

## 2020-10-31 DIAGNOSIS — R51.9 NONINTRACTABLE HEADACHE, UNSPECIFIED CHRONICITY PATTERN, UNSPECIFIED HEADACHE TYPE: ICD-10-CM

## 2020-10-31 DIAGNOSIS — S06.0X0A CONCUSSION WITHOUT LOSS OF CONSCIOUSNESS, INITIAL ENCOUNTER: ICD-10-CM

## 2020-10-31 PROCEDURE — 96375 TX/PRO/DX INJ NEW DRUG ADDON: CPT

## 2020-10-31 PROCEDURE — 96361 HYDRATE IV INFUSION ADD-ON: CPT

## 2020-10-31 PROCEDURE — 99285 EMERGENCY DEPT VISIT HI MDM: CPT | Mod: 25

## 2020-10-31 PROCEDURE — 96374 THER/PROPH/DIAG INJ IV PUSH: CPT

## 2020-10-31 PROCEDURE — 250N000011 HC RX IP 250 OP 636: Performed by: EMERGENCY MEDICINE

## 2020-10-31 PROCEDURE — 258N000003 HC RX IP 258 OP 636: Performed by: EMERGENCY MEDICINE

## 2020-10-31 PROCEDURE — 70450 CT HEAD/BRAIN W/O DYE: CPT

## 2020-10-31 RX ORDER — KETOROLAC TROMETHAMINE 15 MG/ML
15 INJECTION, SOLUTION INTRAMUSCULAR; INTRAVENOUS ONCE
Status: COMPLETED | OUTPATIENT
Start: 2020-10-31 | End: 2020-10-31

## 2020-10-31 RX ORDER — DIPHENHYDRAMINE HYDROCHLORIDE 50 MG/ML
25 INJECTION INTRAMUSCULAR; INTRAVENOUS ONCE
Status: COMPLETED | OUTPATIENT
Start: 2020-10-31 | End: 2020-10-31

## 2020-10-31 RX ORDER — METOCLOPRAMIDE HYDROCHLORIDE 5 MG/ML
10 INJECTION INTRAMUSCULAR; INTRAVENOUS ONCE
Status: COMPLETED | OUTPATIENT
Start: 2020-10-31 | End: 2020-10-31

## 2020-10-31 RX ORDER — SODIUM CHLORIDE 9 MG/ML
INJECTION, SOLUTION INTRAVENOUS CONTINUOUS
Status: DISCONTINUED | OUTPATIENT
Start: 2020-10-31 | End: 2020-11-01 | Stop reason: HOSPADM

## 2020-10-31 RX ADMIN — KETOROLAC TROMETHAMINE 15 MG: 15 INJECTION, SOLUTION INTRAMUSCULAR; INTRAVENOUS at 23:43

## 2020-10-31 RX ADMIN — METOCLOPRAMIDE HYDROCHLORIDE 10 MG: 5 INJECTION INTRAMUSCULAR; INTRAVENOUS at 22:00

## 2020-10-31 RX ADMIN — DIPHENHYDRAMINE HYDROCHLORIDE 25 MG: 50 INJECTION, SOLUTION INTRAMUSCULAR; INTRAVENOUS at 22:01

## 2020-10-31 RX ADMIN — SODIUM CHLORIDE 1000 ML: 9 INJECTION, SOLUTION INTRAVENOUS at 22:01

## 2020-10-31 ASSESSMENT — ENCOUNTER SYMPTOMS
NAUSEA: 0
DIARRHEA: 0
COUGH: 0
WEAKNESS: 0
FEVER: 0
PHOTOPHOBIA: 1
HEADACHES: 1
NUMBNESS: 0
VOMITING: 0

## 2020-10-31 ASSESSMENT — MIFFLIN-ST. JEOR: SCORE: 1354.13

## 2020-11-01 VITALS
DIASTOLIC BLOOD PRESSURE: 65 MMHG | HEIGHT: 63 IN | HEART RATE: 61 BPM | RESPIRATION RATE: 16 BRPM | BODY MASS INDEX: 26.17 KG/M2 | SYSTOLIC BLOOD PRESSURE: 101 MMHG | TEMPERATURE: 97 F | WEIGHT: 147.71 LBS | OXYGEN SATURATION: 100 %

## 2020-11-01 NOTE — ED TRIAGE NOTES
Pt presents for complaint of a headache for x3 days. States it started gradually on Thursday but has been worsening over the last three days. Intermittently nauseated. Denies hx of headaches, but states she did hit her head 1.5 weeks ago. Denies other complaints. ABC intact, A&OX4.

## 2020-11-01 NOTE — DISCHARGE INSTRUCTIONS
Discharge Instructions  Headache    You were seen today for a headache. Headaches may be caused by many different things such as muscle tension, sinus inflammation, anxiety and stress, having too little sleep, too much alcohol, some medical conditions or injury. You may have a migraine, which is caused by changes in the blood vessels in your head.  At this time your provider does not find that your headache is a sign of anything dangerous or life-threatening.  However, sometimes the signs of serious illness do not show up right away.      Generally, every Emergency Department visit should have a follow-up clinic visit with either a primary or a specialty clinic/provider. Please follow-up as instructed by your emergency provider today.    Return to the Emergency Department if:  You get a new fever of 100.4 F or higher.  Your headache gets much worse.  You get a stiff neck with your headache.  You get a new headache that is significantly different or worse than headaches you have had before.  You are vomiting (throwing up) and cannot keep food or water down.  You have blurry or double vision or other problems with your eyes.  You have a new weakness on one side of your body.  You have difficulty with balance which is new.  You or your family thinks you are confused.  You have a seizure.    What can I do to help myself?  Pain medications - You may take a pain medication such as Tylenol  (acetaminophen), Advil , Motrin  (ibuprofen) or Aleve  (naproxen).  Take a pain reliever as soon as you notice symptoms.  Starting medications as soon as you start to have symptoms may lessen the amount of pain you have.  Relaxing in a quiet, dark room may help.  Get enough sleep and eat meals regularly.  You may need to watch for certain foods or other things which may trigger your headaches.  Keeping a journal of your headaches and possible triggers may help you and your primary provider to identify things which you should avoid which  may be causing your headaches.  If you were given a prescription for medicine here today, be sure to read all of the information (including the package insert) that comes with your prescription.  This will include important information about the medicine, its side effects, and any warnings that you need to know about.  The pharmacist who fills the prescription can provide more information and answer questions you may have about the medicine.  If you have questions or concerns that the pharmacist cannot address, please call or return to the Emergency Department.   Remember that you can always come back to the Emergency Department if you are not able to see your regular provider in the amount of time listed above, if you get any new symptoms, or if there is anything that worries you.    Discharge Instructions  Head Injury    You have been seen today for a head injury. Your evaluation included a history and physical examination. You may have had a CT (CAT) scan performed, though most head injuries do not require a scan. Based on this evaluation, your provider today does not feel that your head injury is serious.    Generally, every Emergency Department visit should have a follow-up clinic visit with either a primary or a specialty clinic/provider. Please follow-up as instructed by your emergency provider today.  Return to the Emergency Department if:  You are confused or you are not acting right.  Your headache gets worse or you start to have a really bad headache even with your recommended treatment plan.  You vomit (throw up) more than once.  You have a seizure.  You have trouble walking.  You have weakness or paralysis (cannot move) in an arm or a leg.  You have blood or fluid coming from your ears or nose.  You have new symptoms or anything that worries you.    Sleeping:  It is okay for you to sleep, but someone should wake you up if instructed by your provider, and someone should check on you at your usual time to  wake up.     Activity:  Do not drive for at least 24 hours.  Do not drive if you have dizzy spells or trouble concentrating, or remembering things.  Do not return to any contact sports until cleared by your regular provider.     MORE INFORMATION:    Concussion:  A concussion is a minor head injury that may cause temporary problems with the way the brain works. Although concussions are important, they are generally not an emergency or a reason that a person needs to be hospitalized. Some concussion symptoms include confusion, amnesia (forgetful), nausea (sick to your stomach) and vomiting (throwing up), dizziness, fatigue, memory or concentration problems, irritability and sleep problems. For most people, concussions are mild and temporary but some will have more severe and persistent symptoms that require on-going care and treatment.  CT Scans: Your evaluation today may have included a CT scan (CAT scan) to look for things like bleeding or a skull fracture (broken bone).  CT scans involve radiation and too many CT scans can cause serious health problems like cancer, especially in children.  Because of this, your provider may not have ordered a CT scan today if they think you are at low risk for a serious or life threatening problem.    If you were given a prescription for medicine here today, be sure to read all of the information (including the package insert) that comes with your prescription.  This will include important information about the medicine, its side effects, and any warnings that you need to know about.  The pharmacist who fills the prescription can provide more information and answer questions you may have about the medicine.  If you have questions or concerns that the pharmacist cannot address, please call or return to the Emergency Department.     Remember that you can always come back to the Emergency Department if you are not able to see your regular provider in the amount of time listed above,  if you get any new symptoms, or if there is anything that worries you.

## 2020-11-01 NOTE — ED PROVIDER NOTES
"  History     Chief Complaint:  Headache    HPI   Dorota Perez is a 29 year old female who presents with headache.  Patient states that last week she was blowing leaves off her driveway when she slipped backwards and hit the back of her head on concrete.  She did not pass out.  3 days ago she developed a headache.  She rarely gets headache.  It is frontal in nature.  It has been progressive.  There is photophobia.  There is no vision changes or numbness or weakness in extremities.  There is no fever or cough or vomiting or diarrhea.  She does feel nauseated however.  She does not typically get headaches but this feels similar to when she had preeclampsia many years ago.  She is not currently pregnant and has had a tubal ligation.    Allergies:  No Known Allergies    Medications:    The patient is not currently taking any prescribed medications.    Past Medical History:    Asthma    Past Surgical History:    Appendectomy     Family History:    Mother- hypertension   Father- hypertension     Social History:  Smoking status: Former  Alcohol use: Yes, rarely  Drug use: No  PCP: Physician No Ref-Primary  Presents to the ED by herself  Marital Status:   [2]    Review of Systems   Constitutional: Negative for fever.   Eyes: Positive for photophobia. Negative for visual disturbance.   Respiratory: Negative for cough.    Gastrointestinal: Negative for diarrhea, nausea and vomiting.   Neurological: Positive for headaches. Negative for weakness and numbness.   All other systems reviewed and are negative.    Physical Exam     Patient Vitals for the past 24 hrs:   BP Temp Pulse Resp SpO2 Height Weight   10/31/20 2046 128/72 97  F (36.1  C) 72 16 100 % 1.6 m (5' 3\") 67 kg (147 lb 11.3 oz)        Physical Exam  VS: Reviewed per above  HENT: Mucous membranes moist, no nuchal rigidity. No hemotympanum.  EYES: sclera anicteric  CV: Rate as noted, regular rhythm.   RESP: Effort normal. Breath sounds are normal " bilaterally.  GI: no tenderness/rebound/guarding, not distended.  NEURO: GCS 15, cranial nerves II through XII are intact, 5 out of 5 strength in all 4 extremities, sensation is intact light touch in all 4 extremities  MSK: No deformity of the extremities  SKIN: Warm and dry    Emergency Department Course     Imaging:  Radiographic findings were communicated with the patient who voiced understanding of the findings.  Head CT w/o contrast    (Results Pending)     Interventions:  Medications   0.9% sodium chloride BOLUS (1,000 mLs Intravenous New Bag 10/31/20 2201)     Followed by   sodium chloride 0.9% infusion (has no administration in time range)   ketorolac (TORADOL) injection 15 mg (has no administration in time range)   metoclopramide (REGLAN) injection 10 mg (10 mg Intravenous Given 10/31/20 2200)   diphenhydrAMINE (BENADRYL) injection 25 mg (25 mg Intravenous Given 10/31/20 2201)     Emergency Department Course:  Past medical records, nursing notes, and vitals reviewed.  2103: I performed an exam of the patient and obtained history, as documented above.     The patient was sent for a head CT while in the emergency department, findings above.     Findings and plan explained to the Patient. Patient discharged home with instructions regarding supportive care, medications, and reasons to return. The importance of close follow-up was reviewed.      Patient was signed out to my colleague Dr. Vila.    Impression & Plan      Medical Decision Making:  Patient presents to the ER for evaluation of progressive headache over the past 3 days.  On arrival vital signs within normal limits.  On exam there are no focal neurological deficits.  There is no hemotympanum or puri sign or raccoon eyes or external signs of head trauma.  Nevertheless patient did fall on concrete 1 week ago hitting the back of her head.  Patient also does not routinely get headaches.  For this reason CT of the head was obtained.  Patient was given IV  fluids, Compazine, Benadryl.  At signout to my colleague, patient was awaiting CT of the head results.  If negative, plan for Toradol as well and discharge when symptoms are improved.  Patient endorses a prior tubal ligation like procedure and thus occult pregnancy is quite unlikely.  I encouraged primary care follow-up with patient prior to end of my shift.    Diagnosis:    ICD-10-CM    1. Nonintractable headache, unspecified chronicity pattern, unspecified headache type  R51.9    2. Closed head injury, initial encounter  S09.90XA        Disposition:  Discharged to home.      Discharge Medications:  New Prescriptions    No medications on file       Julieth Baum  10/31/2020   [facility] EMERGENCY DEPT    I, Julieth Baum, am serving as a scribe at 9:41 PM on 10/31/2020 to document services personally performed by Meet Thomas MD based on my observations and the provider's statements to me.         Meet Thomas MD  10/31/20 [id number]

## 2020-11-01 NOTE — ED NOTES
Returned back from CT. States headache seems a little better and nausea improved no emesis noted. Waiting results of CT scan.

## 2020-12-17 ENCOUNTER — NURSE TRIAGE (OUTPATIENT)
Dept: NURSING | Facility: CLINIC | Age: 30
End: 2020-12-17

## 2020-12-17 NOTE — TELEPHONE ENCOUNTER
"Pt calls to inquire about covid serologic antibody testing.  Pt already has order entered for this purpose, dated 7/16/20.  After explanation and discussion, pt states preference for [facility] direct purchase of this lab.  Pt writes contact # for [facility] and requests transfer there now.  Done.    No symptoms at this time.  Headaches one month ago and \"other odd symptoms over a month ago which may have been covid.\"  Appears eligible for antibody testing.    Eva [facility]ALL[facility] Health Nurse Advisor     Additional Information    Health Information question, no triage required and triager able to answer question    General information question, no triage required and triager able to answer question    Protocols used: INFORMATION ONLY CALL-A-AH    _____________________    COVID 19 Nurse Triage Plan/Patient Instructions    Please be aware that novel coronavirus (COVID-19) may be circulating in the community. If you develop symptoms such as fever, cough, or SOB or if you have concerns about the presence of another infection including coronavirus (COVID-19), please contact your health care provider or visit [email or web address].     Disposition/Instructions    Additional COVID19 information to add for patients.   How can I protect others?  If you have symptoms (fever, cough, body aches or trouble breathing): Stay home and away from others (self-isolate) until:    At least 10 days have passed since your symptoms started, And     You ve had no fever--and no medicine that reduces fever--for 1 full day (24 hours), And      Your other symptoms have resolved (gotten better).     If you don t have symptoms, but a test showed that you have COVID-19 (you tested positive):    Stay home and away from others (self-isolate). Follow the tips under \"How do I self-isolate?\" below for 10 days (20 days if you have a weak immune system).    You don't need to be retested for COVID-19 before going back to school or work. As long as " you're fever-free and feeling better, you can go back to school, work and other activities after waiting the 10 or 20 days.     How do I self-isolate?    Stay in your own room, even for meals. Use your own bathroom if you can.     Stay away from others in your home. No hugging, kissing or shaking hands. No visitors.    Don t go to work, school or anywhere else.     Clean  high touch  surfaces often (doorknobs, counters, handles, etc.). Use a household cleaning spray or wipes. You ll find a full list on the EPA website:  www.epa.gov/pesticide-registration/list-n-disinfectants-use-against-sars-cov-2.    Cover your mouth and nose with a mask, tissue or washcloth to avoid spreading germs.    Wash your hands and face often. Use soap and water.    Caregivers in these groups are at risk for severe illness due to COVID-19:  o People 65 years and older  o People who live in a nursing home or long-term care facility  o People with chronic disease (lung, heart, cancer, diabetes, kidney, liver, immunologic)  o People who have a weakened immune system, including those who:  - Are in cancer treatment  - Take medicine that weakens the immune system, such as corticosteroids  - Had a bone marrow or organ transplant  - Have an immune deficiency  - Have poorly controlled HIV or AIDS  - Are obese (body mass index of 40 or higher)  - Smoke regularly    Caregivers should wear gloves while washing dishes, handling laundry and cleaning bedrooms and bathrooms.    Use caution when washing and drying laundry: Don t shake dirty laundry, and use the warmest water setting that you can.    For more tips, go to www.cdc.gov/coronavirus/2019-ncov/downloads/10Things.pdf.    How can I take care of myself?  1. Get lots of rest. Drink extra fluids (unless a doctor has told you not to).     2. Take Tylenol (acetaminophen) for fever or pain. If you have liver or kidney problems, ask your family doctor if it s okay to take Tylenol.     Adults can take  either:     650 mg (two 325 mg pills) every 4 to 6 hours, or     1,000 mg (two 500 mg pills) every 8 hours as needed.     Note: Don t take more than 3,000 mg in one day.   Acetaminophen is found in many medicines (both prescribed and over-the-counter medicines). Read all labels to be sure you don t take too much.     For children, check the Tylenol bottle for the right dose. The dose is based on the child s age or weight.    3. If you have other health problems (like cancer, heart failure, an organ transplant or severe kidney disease): Call your specialty clinic if you don t feel better in the next 2 days.    4. Know when to call 911: Emergency warning signs include:    Trouble breathing or shortness of breath    Pain or pressure in the chest that doesn t go away    Feeling confused like you haven t felt before, or not being able to wake up    Bluish-colored lips or face    What are the symptoms of COVID-19?     The most common symptoms are cough, fever and trouble breathing.     Less common symptoms include body aches, chills, diarrhea (loose, watery poops), fatigue (feeling very tired), headache, runny nose, sore throat and loss of smell.    COVID-19 can cause severe coughing (bronchitis) and lung infection (pneumonia).    How does it spread?     The virus may spread when a person coughs or sneezes into the air. The virus can travel about 6 feet this way, and it can live on surfaces.      Common  (household disinfectants) will kill the virus.    Who is at risk?  Anyone can catch COVID-19 if they re around someone who has the virus.    How can others protect themselves?     Stay away from people who have COVID-19 (or symptoms of COVID-19).    Wash hands often with soap and water. Or, use hand  with at least 60% alcohol.    Avoid touching the eyes, nose or mouth.     Wear a face mask when you go out in public, when sick or when caring for a sick person.    Where can I get more information?    [facility]: About COVID-19: [email or web address]    CDC: What to Do If You re Sick: www.cdc.gov/coronavirus/2019-ncov/about/steps-when-sick.html    CDC: Ending Home Isolation: www.cdc.gov/coronavirus/2019-ncov/hcp/disposition-in-home-patients.html     CDC: Caring for Someone: www.cdc.gov/coronavirus/2019-ncov/if-you-are-sick/care-for-someone.html     [facility]: Interim Guidance for Hospital Discharge to Home: www.[facility]/diseases/coronavirus/hcp/hospdischarge.pdf    [facility] clinical trials (COVID-19 research studies): clinicalaffairs.[facility]-clinical-trials     Below are the COVID-19 hotlines at the [place] [facility] ([facility]). Interpreters are available.   o For health questions: Call 130-165-3783 or 1-564.915.6671 (7 a.m. to 7 p.m.)  o For questions about schools and childcare: Call 510-391-9987 or 1-470.219.5706 (7 a.m. to 7 p.m.)          Thank you for taking steps to prevent the spread of this virus.  o Limit your contact with others.  o Wear a simple mask to cover your cough.  o Wash your hands well and often.    Resources    [facility]: About COVID-19: [email or web address]    CDC: What to Do If You're Sick: www.cdc.gov/coronavirus/2019-ncov/about/steps-when-sick.html    CDC: Ending Home Isolation: www.cdc.gov/coronavirus/2019-ncov/hcp/disposition-in-home-patients.html     CDC: Caring for Someone: www.cdc.gov/coronavirus/2019-ncov/if-you-are-sick/care-for-someone.html     [facility]: Interim Guidance for Hospital Discharge to Home: www.[facility]/diseases/coronavirus/hcp/hospdischarge.pdf    [facility] clinical trials (COVID-19 research studies): clinicalaffairs[facility]-clinical-trials     Below are the COVID-19 hotlines at the [place] [facility] ([facility]). Interpreters are available.   o For health questions: Call 148-798-2013 or 1-978.764.9911 (7 a.m. to 7 p.m.)  o For questions about schools and childcare: Call  781.818.4102 or 1-336.206.8063 (7 a.m. to 7 p.m.)

## 2020-12-27 ENCOUNTER — HEALTH MAINTENANCE LETTER (OUTPATIENT)
Age: 30
End: 2020-12-27

## 2021-02-10 ENCOUNTER — HOSPITAL ENCOUNTER (EMERGENCY)
Facility: CLINIC | Age: 31
Discharge: HOME | End: 2021-02-10
Attending: PHYSICIAN ASSISTANT | Admitting: PHYSICIAN ASSISTANT
Payer: COMMERCIAL

## 2021-02-10 VITALS
SYSTOLIC BLOOD PRESSURE: 111 MMHG | HEART RATE: 96 BPM | OXYGEN SATURATION: 100 % | RESPIRATION RATE: 17 BRPM | TEMPERATURE: 98.5 F | DIASTOLIC BLOOD PRESSURE: 76 MMHG

## 2021-02-10 DIAGNOSIS — R00.0 TACHYCARDIA: ICD-10-CM

## 2021-02-10 DIAGNOSIS — R00.2 PALPITATIONS: ICD-10-CM

## 2021-02-10 LAB
ANION GAP SERPL CALCULATED.3IONS-SCNC: 5 MMOL/L (ref 3–14)
BASOPHILS # BLD AUTO: 0.1 10E9/L (ref 0–0.2)
BASOPHILS NFR BLD AUTO: 0.7 %
BUN SERPL-MCNC: 9 MG/DL (ref 7–30)
CALCIUM SERPL-MCNC: 8.9 MG/DL (ref 8.5–10.1)
CHLORIDE SERPL-SCNC: 109 MMOL/L (ref 94–109)
CO2 SERPL-SCNC: 27 MMOL/L (ref 20–32)
CREAT SERPL-MCNC: 0.78 MG/DL (ref 0.52–1.04)
DIFFERENTIAL METHOD BLD: NORMAL
EOSINOPHIL # BLD AUTO: 0 10E9/L (ref 0–0.7)
EOSINOPHIL NFR BLD AUTO: 0.3 %
ERYTHROCYTE [DISTWIDTH] IN BLOOD BY AUTOMATED COUNT: 12.7 % (ref 10–15)
GFR SERPL CREATININE-BSD FRML MDRD: >90 ML/MIN/{1.73_M2}
GLUCOSE SERPL-MCNC: 95 MG/DL (ref 70–99)
HCT VFR BLD AUTO: 42.1 % (ref 35–47)
HGB BLD-MCNC: 13.6 G/DL (ref 11.7–15.7)
IMM GRANULOCYTES # BLD: 0 10E9/L (ref 0–0.4)
IMM GRANULOCYTES NFR BLD: 0.3 %
LYMPHOCYTES # BLD AUTO: 1.3 10E9/L (ref 0.8–5.3)
LYMPHOCYTES NFR BLD AUTO: 13.9 %
MAGNESIUM SERPL-MCNC: 2.2 MG/DL (ref 1.6–2.3)
MCH RBC QN AUTO: 30.5 PG (ref 26.5–33)
MCHC RBC AUTO-ENTMCNC: 32.3 G/DL (ref 31.5–36.5)
MCV RBC AUTO: 94 FL (ref 78–100)
MONOCYTES # BLD AUTO: 0.7 10E9/L (ref 0–1.3)
MONOCYTES NFR BLD AUTO: 7.9 %
NEUTROPHILS # BLD AUTO: 7.2 10E9/L (ref 1.6–8.3)
NEUTROPHILS NFR BLD AUTO: 76.9 %
NRBC # BLD AUTO: 0 10*3/UL
NRBC BLD AUTO-RTO: 0 /100
PLATELET # BLD AUTO: 332 10E9/L (ref 150–450)
POTASSIUM SERPL-SCNC: 3.4 MMOL/L (ref 3.4–5.3)
RBC # BLD AUTO: 4.46 10E12/L (ref 3.8–5.2)
SODIUM SERPL-SCNC: 141 MMOL/L (ref 133–144)
TROPONIN I SERPL-MCNC: <0.015 UG/L (ref 0–0.04)
TSH SERPL DL<=0.005 MIU/L-ACNC: 0.73 MU/L (ref 0.4–4)
WBC # BLD AUTO: 9.4 10E9/L (ref 4–11)

## 2021-02-10 PROCEDURE — 84443 ASSAY THYROID STIM HORMONE: CPT | Performed by: PHYSICIAN ASSISTANT

## 2021-02-10 PROCEDURE — 93005 ELECTROCARDIOGRAM TRACING: CPT

## 2021-02-10 PROCEDURE — 84484 ASSAY OF TROPONIN QUANT: CPT | Performed by: PHYSICIAN ASSISTANT

## 2021-02-10 PROCEDURE — 80048 BASIC METABOLIC PNL TOTAL CA: CPT | Performed by: PHYSICIAN ASSISTANT

## 2021-02-10 PROCEDURE — 83735 ASSAY OF MAGNESIUM: CPT | Performed by: PHYSICIAN ASSISTANT

## 2021-02-10 PROCEDURE — 85025 COMPLETE CBC W/AUTO DIFF WBC: CPT | Performed by: PHYSICIAN ASSISTANT

## 2021-02-10 PROCEDURE — 99284 EMERGENCY DEPT VISIT MOD MDM: CPT

## 2021-02-10 ASSESSMENT — ENCOUNTER SYMPTOMS
LIGHT-HEADEDNESS: 0
BLOOD IN STOOL: 0
VOMITING: 0
COUGH: 0
CHILLS: 0
ABDOMINAL PAIN: 0
HEADACHES: 0
PALPITATIONS: 1
NAUSEA: 0
SHORTNESS OF BREATH: 0
FEVER: 0

## 2021-02-10 NOTE — ED TRIAGE NOTES
Pt presents to ED with c/o pounding/racing heart that began at 1330 and lasted approx 5 minutes. Pt states that during this time her pulse oximeter read over 200. Pt called 911, and by the time medics arrived, her rate was 110-130. Pt's EKG was normal. Pt denies any history of cardiac issues. Pt did not come by ambulance right away as she has two small children at home and couldn't leave them alone. ABC intact. A/O x4.

## 2021-02-10 NOTE — DISCHARGE INSTRUCTIONS
Please review attached instructions. I placed an order for a Zio Patch.You should be contacted tomorrow to have this placed. Please return to the ED if you develop chest pain, SOB, or any other medical concerns.

## 2021-02-10 NOTE — ED PROVIDER NOTES
"  History   Chief Complaint:  Palpitations     HPI   Dorota Perez is a 30 year old female with history of uncomplicated asthma who presents with palpitations.    Review of Systems  ***    Allergies:  No known drug allergies    Medications:  Percocet     Past Medical History:    Gestational Hypertension   Headache in Pregnancy   Uncomplicated Asthma   Vaginal Delivery     Past Surgical History:    Appendectomy     Family History:    Mother - Hypertension  Father - Hypertension     Social History:  Patient arrives unaccompanied.     Physical Exam     Patient Vitals for the past 24 hrs:   BP Temp Pulse Resp SpO2   02/10/21 1442 (!) 133/100 98.5  F (36.9  C) 134 16 100 %       Physical Exam  ***    Emergency Department Course   ECG***  ECG taken at ***, ECG read at ***  ***   *** as compared to prior, dated ***/***/***.  Rate *** bpm. TX interval *** ms. QRS duration *** ms. QT/QTc *** ms. P-R-T axes *** *** ***.     Imaging:  No orders to display       Laboratory:  CBC: WBC ***, HGB ***, PLT ***  BMP: Glucose ***, ***, o/w WNL (Creatinine: ***)      Procedures  ***    Emergency Department Course:    Reviewed:  ***  I reviewed {[place]:946907}    Assessments:  *** I obtained history and examined the patient as noted above.   *** I rechecked the patient*** and explained findings***.   ***    Consults:   ***    Interventions:  ***    Disposition:  {\[facility] [place] [facility]\"" Dispo:197300}      Impression & Plan   {[facility] Quality Projects:529989}    CMS Diagnoses: {Sepsis/Septic Shock/Stemi/Stroke:979898::\"None\"}    Medical Decision Making:  ***     Critical Care Time: was *** minutes for this patient excluding procedures    Covid-19  Dorota Perez was evaluated during a global COVID-19 pandemic, which necessitated consideration that the patient might be at risk for infection with the SARS-CoV-2 virus that causes COVID-19.   Applicable protocols for evaluation were followed during the patient's care.   COVID-19 was " considered as part of the patient's evaluation. The plan for testing is:  a test was obtained during this visit.***  a test was obtained at a previous visit and reviewed & considered today.***  the patient was referred for outpatient testing.***   ***     Diagnosis:  No diagnosis found.    Discharge Medications:  New Prescriptions    No medications on file       Scribe Disclosure:  I, ***, am serving as a scribe at 3:09 PM on 2/10/2021 to document services personally performed by No att. providers found*** based on my observations and the provider's statements to me.     ***

## 2021-02-10 NOTE — ED PROVIDER NOTES
History   Chief Complaint:  Palpitations and Tachycardia      HPI   Dorota Perez is a 30 year old female who presents to the ED via private transport for evaluation of palpitations and tachycardia. Around 1330, the patient started noticing that her heart was pounding really fast and hard for a duration of 5 minutes. Of note, the patient has never experienced this before.  Using her daughter's heart rate monitor, she saw that her heart rate was over 200. After discussing the reading with her mother, she decided to call 911. Paramedics confirmed the accuracy of the monitor, and prior to EMS departure her heart rate was 110-130. After waiting for her grandparents to come watch her kids, she left for the hospital. Here in the ED, she states that during the episode she did feel short of breath which last for seconds and lightheaded at one point which has since resolved. She does not know what could have caused her to become so tachycardic as she was not doing anything out of the ordinary. Her blood sugar level was normal. She currently feels well and does not complain of any pain. She denies a personal or family history of heart problems. She denies a history of blood clots. She denies fever, chills, headaches, ear pain, shortness of breath, cough, sore throat, chest pain, abdominal pain, nausea, vomiting, diarrhea, blood in her stool, or any other medical concerns. She does not endorse daily drinking, drug use, or tobacco use. She is currently not taking any medications. The patient denied any recent illness.      Review of Systems   Constitutional: Negative for chills and fever.   HENT: Negative for ear pain.    Respiratory: Negative for cough and shortness of breath.    Cardiovascular: Positive for palpitations (tachycardic). Negative for chest pain.   Gastrointestinal: Negative for abdominal pain, blood in stool, nausea and vomiting.   Neurological: Negative for light-headedness and headaches.   All other  systems reviewed and are negative.    Allergies:  The patient has no known allergies.     Medications:  The patient is not currently taking any prescribed medications.    Past Medical History:    Anxiety  Gestational hypertension  Gallstones   Uncomplicated asthma    Past Surgical History:    Appendectomy  [place] teeth extraction  Laparoscopic cholecystectomy  Tubal ligation    Family History:    Hypertension  Diabetes    Social History:  The patient denies daily alcohol/drug/tobacco use.   The patient presented via EMS.    Physical Exam     Patient Vitals for the past 24 hrs:   BP Temp Pulse Resp SpO2   02/10/21 1630 (!) 125/96 -- 86 14 100 %   02/10/21 1600 -- -- 86 15 --   02/10/21 1442 (!) 133/100 98.5  F (36.9  C) 134 16 100 %       Physical Exam  Vitals signs and nursing note reviewed.   HENT:      Nose: Nose normal. No congestion or rhinorrhea.      Mouth/Throat:      Mouth: Mucous membranes are moist.   Eyes:      General: No scleral icterus.     Extraocular Movements: Extraocular movements intact.      Conjunctiva/sclera: Conjunctivae normal.   Cardiovascular:      Rate and Rhythm: Regular rhythm. Tachycardiac upon arrival to the ED, normalized throughout her ED stay.     Pulses: Normal pulses.      Heart sounds: Normal heart sounds.   Pulmonary:      Effort: Pulmonary effort is normal.      Breath sounds: Normal breath sounds.   Abdominal:      General: Abdomen is flat. Bowel sounds are normal.      Palpations: Abdomen is soft.      Tenderness: There is no abdominal tenderness.   Musculoskeletal: Normal range of motion of bilateral upper and lower extremities.      Right lower leg: No edema.      Left lower leg: No edema.   Skin:     General: Skin is warm and dry. No lesions or open areas on exposed skin.  Neurological:      Mental Status: She is alert and oriented to person, place, and time.   Psychiatric:         Mood and Affect: Mood normal.         Behavior: Behavior normal.     Emergency Department  Course   ECG  ECG taken at 14:46:09, ECG read at 1451  Normal sinus rhythm. Nonspecific ST abnormality. Abnormal ECG.   Rate 98 bpm. NH interval 142 ms. QRS duration 70 ms. QT/QTc 356/454 ms. P-R-T axes 68 43 28.     Laboratory:  CBC: AWNL. (WBC 9.4, HGB 13.6, )      BMP: AWNL (Creatinine 0.78)    TSH with free T4 reflex: 0.73    Magnesium: 2.2    Troponin (Collected 1531): <0.015     Emergency Department Course:    Reviewed:  1600: I reviewed nursing notes.    Assessments:  1605 I obtained history and examined the patient as noted above.   1729 I rechecked the patient and explained findings. Patient feels comfortable with discharge.    Disposition:  The patient was discharged to home.     Impression & Plan   Medical Decision Making:  Dorota Perez is a 30 year old female who presents for evaluation of palpitations and tachycardia. Vitals were reviewed. On arrival the patient was found to be tachycardiac at a rate of 134 which normalized throughout her ED stay.  ECG shows NSR. No ischemic changes.  A broad differential diagnosis was considered including SVT, Atrial fibrillation, ventricular arrhythmia, thyroid disease, acute electrolyte abnormality,  drugs/medications, caffeine intake or other stimulants, medication side effect, anemia, PE, etc. Labs were obtained and reviewed. Troponin undetected. No electrolyte abnormalities. TSH normal at 0.73. PE considered however unlikel in the absence of hemoptysis, persistent dyspnea, hypoxia, nor persistent tachycardia. Well's criteria utilized. Doubt acute coronary syndrome given symptoms and exam.  The patient was monitored on telemetry throughout her ED stay. No arrhythmias detected. I discussed the results and my clinical impression with the patient. She verbalized understanding. On reassessment, the patient denied any medical concerns including chest pain, palpitations, SOB, abdominal pain, etc therefore I felt that she was safe for discharge home. Zio patch  ordered. The patient was informed that she will be contacted to have this placed. She was advised to return to the ED if she develops chest pain, SOB, fever, or any other medical concerns. All questions and concerns were addressed prior to discharge.     Diagnosis:    ICD-10-CM    1. Tachycardia  R00.0 Leadless EKG Monitor 3 to 7 Days   2. Palpitations  R00.2 Leadless EKG Monitor 3 to 7 Days       Discharge Medications:  New Prescriptions    No medications on file       Scribe Disclosure:  I, Romelia Crowe, am serving as a scribe at 4:06 PM on 2/10/2021 to document services personally performed by Maryjo Orr PA-C based on my observations and the provider's statements to me.              Maryjo Orr PA-C  02/10/21 [id number]

## 2021-02-11 LAB — INTERPRETATION ECG - MUSE: NORMAL

## 2021-02-12 ENCOUNTER — HOSPITAL ENCOUNTER (OUTPATIENT)
Dept: CARDIOLOGY | Facility: CLINIC | Age: 31
Discharge: HOME | End: 2021-02-12
Attending: PHYSICIAN ASSISTANT | Admitting: PHYSICIAN ASSISTANT
Payer: COMMERCIAL

## 2021-02-12 DIAGNOSIS — R00.2 PALPITATIONS: ICD-10-CM

## 2021-02-12 DIAGNOSIS — R00.0 TACHYCARDIA: ICD-10-CM

## 2021-02-12 PROCEDURE — 93244 EXT ECG>48HR<7D REV&INTERPJ: CPT | Performed by: INTERNAL MEDICINE

## 2021-02-12 PROCEDURE — 93242 EXT ECG>48HR<7D RECORDING: CPT

## 2021-04-24 ENCOUNTER — HEALTH MAINTENANCE LETTER (OUTPATIENT)
Age: 31
End: 2021-04-24

## 2021-10-04 ENCOUNTER — HEALTH MAINTENANCE LETTER (OUTPATIENT)
Age: 31
End: 2021-10-04

## 2022-05-15 ENCOUNTER — HEALTH MAINTENANCE LETTER (OUTPATIENT)
Age: 32
End: 2022-05-15

## 2022-09-11 ENCOUNTER — HEALTH MAINTENANCE LETTER (OUTPATIENT)
Age: 32
End: 2022-09-11

## 2023-06-03 ENCOUNTER — HEALTH MAINTENANCE LETTER (OUTPATIENT)
Age: 33
End: 2023-06-03

## 2024-08-18 ENCOUNTER — OFFICE VISIT (OUTPATIENT)
Dept: URGENT CARE | Facility: URGENT CARE | Age: 34
End: 2024-08-18
Payer: COMMERCIAL

## 2024-08-18 VITALS
DIASTOLIC BLOOD PRESSURE: 80 MMHG | HEART RATE: 80 BPM | TEMPERATURE: 98.2 F | OXYGEN SATURATION: 98 % | SYSTOLIC BLOOD PRESSURE: 120 MMHG

## 2024-08-18 DIAGNOSIS — L08.9 LOCAL INFECTION OF SKIN AND SUBCUTANEOUS TISSUE: ICD-10-CM

## 2024-08-18 DIAGNOSIS — L30.9 DERMATITIS: Primary | ICD-10-CM

## 2024-08-18 PROCEDURE — 99203 OFFICE O/P NEW LOW 30 MIN: CPT | Performed by: PHYSICIAN ASSISTANT

## 2024-08-18 RX ORDER — TRIAMCINOLONE ACETONIDE 1 MG/G
CREAM TOPICAL 3 TIMES DAILY
Qty: 60 G | Refills: 1 | Status: SHIPPED | OUTPATIENT
Start: 2024-08-18 | End: 2024-08-28

## 2024-08-18 RX ORDER — CEPHALEXIN 500 MG/1
500 CAPSULE ORAL 3 TIMES DAILY
Qty: 30 CAPSULE | Refills: 0 | Status: SHIPPED | OUTPATIENT
Start: 2024-08-18 | End: 2024-08-28

## 2024-08-18 RX ORDER — HYDROXYZINE HYDROCHLORIDE 25 MG/1
12.5-25 TABLET, FILM COATED ORAL
COMMUNITY
Start: 2024-06-25

## 2024-08-18 RX ORDER — SERTRALINE HYDROCHLORIDE 25 MG/1
TABLET, FILM COATED ORAL
COMMUNITY
Start: 2023-09-15

## 2024-08-18 NOTE — PROGRESS NOTES
Assessment & Plan     Dermatitis  Triamcinolone cream is prescribed.  Patient educational information provided regarding course of symptoms.  Advised to keep monitoring symptoms.  Follow-up if any worsening symptoms.  Patient agrees.  - triamcinolone (KENALOG) 0.1 % external cream  Dispense: 60 g; Refill: 1    Local infection of skin and subcutaneous tissue  Couple lesions with surrounding erythema concerning for secondary local bacterial skin infection.  Keflex is prescribed today.  Follow-up if any worsening symptoms.  Patient agrees with the plan.  - cephALEXin (KEFLEX) 500 MG capsule  Dispense: 30 capsule; Refill: 0       Return in about 1 week (around 8/25/2024) for Symptoms failing to improve.    Ruthy De Leon PA-C  [facility] [place] Raya is a 33 year old female who presents to clinic today for the following health issues:  Chief Complaint   Patient presents with    Urgent Care    Derm Problem     Pt has 3 barn cat and thinks they all got ringworm from holding that cats 2 days ago. All on her arms and neck       HPI    She is presenting to urgent care today with complaint of a rash on her arms and legs.  Onset of symptoms 3 days ago.  Symptoms started after weeding some plants.  [place]  She has been using hydrogen peroxide to the affected areas.  Affected area is slightly itchy, one area starting to get red and swollen since this morning.  No recent fevers or chills.  Treatment tried: Tea tree oil. No Hx of MRSA.      Review of Systems  Constitutional, HEENT, cardiovascular, pulmonary, GI, , musculoskeletal, neuro, skin, endocrine and psych systems are negative, except as otherwise noted.      Objective    /80   Pulse 80   Temp 98.2  F (36.8  C) (Tympanic)   LMP 08/15/2024   SpO2 98%   Physical Exam   GENERAL: alert and no distress  SKIN: Circular erythematous lesions with some honey crust noted on her arms and one lesion noted on the right  anterior lower leg, moderate swelling and erythema surrounding one of the lesions on the right arm, there is tenderness to palpation.  Please see pictures below.

## 2024-09-15 ENCOUNTER — HEALTH MAINTENANCE LETTER (OUTPATIENT)
Age: 34
End: 2024-09-15

## 2025-05-28 ENCOUNTER — OFFICE VISIT (OUTPATIENT)
Dept: FAMILY MEDICINE | Facility: CLINIC | Age: 35
End: 2025-05-28
Payer: COMMERCIAL

## 2025-05-28 VITALS
OXYGEN SATURATION: 98 % | HEART RATE: 76 BPM | HEIGHT: 64 IN | BODY MASS INDEX: 28.8 KG/M2 | WEIGHT: 168.7 LBS | RESPIRATION RATE: 16 BRPM | DIASTOLIC BLOOD PRESSURE: 77 MMHG | TEMPERATURE: 98.2 F | SYSTOLIC BLOOD PRESSURE: 111 MMHG

## 2025-05-28 DIAGNOSIS — F41.9 ANXIETY: ICD-10-CM

## 2025-05-28 DIAGNOSIS — R53.83 OTHER FATIGUE: ICD-10-CM

## 2025-05-28 DIAGNOSIS — N94.6 DYSMENORRHEA: ICD-10-CM

## 2025-05-28 DIAGNOSIS — Z12.4 CERVICAL CANCER SCREENING: ICD-10-CM

## 2025-05-28 DIAGNOSIS — Z11.59 NEED FOR HEPATITIS C SCREENING TEST: ICD-10-CM

## 2025-05-28 DIAGNOSIS — Z13.220 LIPID SCREENING: ICD-10-CM

## 2025-05-28 DIAGNOSIS — Z13.1 SCREENING FOR DIABETES MELLITUS: ICD-10-CM

## 2025-05-28 DIAGNOSIS — Z00.00 ROUTINE GENERAL MEDICAL EXAMINATION AT A HEALTH CARE FACILITY: Primary | ICD-10-CM

## 2025-05-28 LAB
ALBUMIN SERPL BCG-MCNC: 4.2 G/DL (ref 3.5–5.2)
ALP SERPL-CCNC: 83 U/L (ref 40–150)
ALT SERPL W P-5'-P-CCNC: 34 U/L (ref 0–50)
ANION GAP SERPL CALCULATED.3IONS-SCNC: 10 MMOL/L (ref 7–15)
AST SERPL W P-5'-P-CCNC: 27 U/L (ref 0–45)
BILIRUB SERPL-MCNC: 0.9 MG/DL
BUN SERPL-MCNC: 9.3 MG/DL (ref 6–20)
CALCIUM SERPL-MCNC: 9 MG/DL (ref 8.8–10.4)
CHLORIDE SERPL-SCNC: 105 MMOL/L (ref 98–107)
CHOLEST SERPL-MCNC: 169 MG/DL
CREAT SERPL-MCNC: 0.74 MG/DL (ref 0.51–0.95)
EGFRCR SERPLBLD CKD-EPI 2021: >90 ML/MIN/1.73M2
ERYTHROCYTE [DISTWIDTH] IN BLOOD BY AUTOMATED COUNT: 12.8 % (ref 10–15)
EST. AVERAGE GLUCOSE BLD GHB EST-MCNC: 105 MG/DL
FASTING STATUS PATIENT QL REPORTED: YES
FASTING STATUS PATIENT QL REPORTED: YES
FERRITIN SERPL-MCNC: 42 NG/ML (ref 6–175)
GLUCOSE SERPL-MCNC: 104 MG/DL (ref 70–99)
HBA1C MFR BLD: 5.3 % (ref 0–5.6)
HCO3 SERPL-SCNC: 23 MMOL/L (ref 22–29)
HCT VFR BLD AUTO: 38.6 % (ref 35–47)
HCV AB SERPL QL IA: NONREACTIVE
HDLC SERPL-MCNC: 57 MG/DL
HGB BLD-MCNC: 12.9 G/DL (ref 11.7–15.7)
HOLD SPECIMEN: NORMAL
IRON BINDING CAPACITY (ROCHE): 290 UG/DL (ref 240–430)
IRON SATN MFR SERPL: 41 % (ref 15–46)
IRON SERPL-MCNC: 119 UG/DL (ref 37–145)
LDLC SERPL CALC-MCNC: 98 MG/DL
MCH RBC QN AUTO: 30 PG (ref 26.5–33)
MCHC RBC AUTO-ENTMCNC: 33.4 G/DL (ref 31.5–36.5)
MCV RBC AUTO: 90 FL (ref 78–100)
NONHDLC SERPL-MCNC: 112 MG/DL
PLATELET # BLD AUTO: 326 10E3/UL (ref 150–450)
POTASSIUM SERPL-SCNC: 3.9 MMOL/L (ref 3.4–5.3)
PROT SERPL-MCNC: 6.7 G/DL (ref 6.4–8.3)
RBC # BLD AUTO: 4.3 10E6/UL (ref 3.8–5.2)
SODIUM SERPL-SCNC: 138 MMOL/L (ref 135–145)
T4 FREE SERPL-MCNC: 0.99 NG/DL (ref 0.9–1.7)
TRIGL SERPL-MCNC: 69 MG/DL
TSH SERPL DL<=0.005 MIU/L-ACNC: 0.25 UIU/ML (ref 0.3–4.2)
VIT D+METAB SERPL-MCNC: 33 NG/ML (ref 20–50)
WBC # BLD AUTO: 5.2 10E3/UL (ref 4–11)

## 2025-05-28 PROCEDURE — 99395 PREV VISIT EST AGE 18-39: CPT

## 2025-05-28 PROCEDURE — 83540 ASSAY OF IRON: CPT

## 2025-05-28 PROCEDURE — 99214 OFFICE O/P EST MOD 30 MIN: CPT | Mod: 25

## 2025-05-28 PROCEDURE — 83550 IRON BINDING TEST: CPT

## 2025-05-28 PROCEDURE — 85027 COMPLETE CBC AUTOMATED: CPT

## 2025-05-28 PROCEDURE — 3044F HG A1C LEVEL LT 7.0%: CPT

## 2025-05-28 PROCEDURE — 86803 HEPATITIS C AB TEST: CPT

## 2025-05-28 PROCEDURE — 36415 COLL VENOUS BLD VENIPUNCTURE: CPT

## 2025-05-28 PROCEDURE — 87624 HPV HI-RISK TYP POOLED RSLT: CPT

## 2025-05-28 PROCEDURE — 82728 ASSAY OF FERRITIN: CPT

## 2025-05-28 PROCEDURE — 3078F DIAST BP <80 MM HG: CPT

## 2025-05-28 PROCEDURE — 82306 VITAMIN D 25 HYDROXY: CPT

## 2025-05-28 PROCEDURE — 84443 ASSAY THYROID STIM HORMONE: CPT

## 2025-05-28 PROCEDURE — 83036 HEMOGLOBIN GLYCOSYLATED A1C: CPT

## 2025-05-28 PROCEDURE — G2211 COMPLEX E/M VISIT ADD ON: HCPCS

## 2025-05-28 PROCEDURE — 80061 LIPID PANEL: CPT

## 2025-05-28 PROCEDURE — 84439 ASSAY OF FREE THYROXINE: CPT

## 2025-05-28 PROCEDURE — 80053 COMPREHEN METABOLIC PANEL: CPT

## 2025-05-28 PROCEDURE — 3074F SYST BP LT 130 MM HG: CPT

## 2025-05-28 SDOH — HEALTH STABILITY: PHYSICAL HEALTH: ON AVERAGE, HOW MANY DAYS PER WEEK DO YOU ENGAGE IN MODERATE TO STRENUOUS EXERCISE (LIKE A BRISK WALK)?: 2 DAYS

## 2025-05-28 SDOH — HEALTH STABILITY: PHYSICAL HEALTH: ON AVERAGE, HOW MANY MINUTES DO YOU ENGAGE IN EXERCISE AT THIS LEVEL?: 20 MIN

## 2025-05-28 ASSESSMENT — SOCIAL DETERMINANTS OF HEALTH (SDOH): HOW OFTEN DO YOU GET TOGETHER WITH FRIENDS OR RELATIVES?: ONCE A WEEK

## 2025-05-28 NOTE — PROGRESS NOTES
Preventive Care Visit  [facility]  CHINEDU Huston CNP, Family Medicine  May 28, 2025      Assessment & Plan     Routine general medical examination at a health care facility  Reviewed age and gender appropriate screenings and lifestyle modifications with patient.     Cervical cancer screening  -Difficult for patient to relax. No cervical motion tenderness.   -History NIL, repeat as indicated.   - HPV and Gynecologic Cytology Panel - Recommended Age 30 - 65 Years    Screening for diabetes mellitus  Screening, reviewed lifestyle modifications.   - Comprehensive metabolic panel (BMP + Alb, Alk Phos, ALT, AST, Total. Bili, TP)  - Hemoglobin A1c  - Comprehensive metabolic panel (BMP + Alb, Alk Phos, ALT, AST, Total. Bili, TP)  - Hemoglobin A1c    Need for hepatitis C screening test  - Hepatitis C Screen Reflex to HCV RNA Quant and Genotype  - Hepatitis C Screen Reflex to HCV RNA Quant and Genotype    Lipid screening  Screening, minimal family history so keep ASCVD risk <10%  - Comprehensive metabolic panel (BMP + Alb, Alk Phos, ALT, AST, Total. Bili, TP)  - Lipid panel reflex to direct LDL Fasting  - Comprehensive metabolic panel (BMP + Alb, Alk Phos, ALT, AST, Total. Bili, TP)  - Lipid panel reflex to direct LDL Fasting    Dysmenorrhea  She is wondering about a fibroid. Plan for ultrasound with follow up as indicated. May refer to OBGYN or start on hormones to manage.   - US Pelvic Complete with Transvaginal  - CBC with Platelets and Reflex to Iron Studies  - CBC with Platelets and Reflex to Iron Studies  - Iron & Iron Binding Capacity  - Ferritin    Anxiety  Stable chronic, well controlled on 75 mg dose. Refills provided.   - sertraline (ZOLOFT) 50 MG tablet  Dispense: 135 tablet; Refill: 3    Other fatigue  Rule out physiologic cause.   - TSH with free T4 reflex  - Vitamin D Deficiency  - TSH with free T4 reflex  - Vitamin D Deficiency    The longitudinal plan of care for the  "diagnosis(es)/condition(s) as documented were addressed during this visit. Due to the added complexity in care, I will continue to support Dorota in the subsequent management and with ongoing continuity of care.              BMI  Estimated body mass index is 29.42 kg/m  as calculated from the following:    Height as of this encounter: 1.613 m (5' 3.5\").    Weight as of this encounter: 76.5 kg (168 lb 11.2 oz).       Counseling  Appropriate preventive services were addressed with this patient via screening, questionnaire, or discussion as appropriate for fall prevention, nutrition, physical activity, Tobacco-use cessation, social engagement, weight loss and cognition.  Checklist reviewing preventive services available has been given to the patient.  Reviewed patient's diet, addressing concerns and/or questions.   She is at risk for lack of exercise and has been provided with information to increase physical activity for the benefit of her well-being.   She is at risk for psychosocial distress and has been provided with information to reduce risk.           Subjective   Dorota is a 34 year old, presenting for the following:  Physical (Fasting. Establish care)        5/28/2025     6:58 AM   Additional Questions   Roomed by ANGE Fink  Dorota Perez, 34-year-old female    - Concerned about potential fibroids due to a very heavy and painful menstrual period two months ago, with significant blood clots and bleeding that required leaving work.  - Last menstrual period was not as severe as the one two months ago.  - History of exercise-induced asthma.  - History of supraventricular tachycardia (SVT), with occasional short-lived episodes in the past year causing presyncopal symptoms.  - Had a normal Pap smear in 2020, with a history of an abnormal Pap smear in 2011.  - Previously on birth control which regulated heavy periods; not on birth control post-tubal ligation.  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        Advance Care Planning    Discussed advance care planning with patient; however, patient declined at this time.        5/28/2025   General Health   How would you rate your overall physical health? Good   Feel stress (tense, anxious, or unable to sleep) Rather much   (!) STRESS CONCERN      5/28/2025   Nutrition   Three or more servings of calcium each day? (!) I DON'T KNOW   Diet: Regular (no restrictions)   How many servings of fruit and vegetables per day? (!) I DON'T KNOW   How many sweetened beverages each day? (!) I DON'T KNOW         5/28/2025   Exercise   Days per week of moderate/strenous exercise 2 days   Average minutes spent exercising at this level 20 min   (!) EXERCISE CONCERN      5/28/2025   Social Factors   Frequency of gathering with friends or relatives Once a week   Worry food won't last until get money to buy more No   Food not last or not have enough money for food? No   Do you have housing? (Housing is defined as stable permanent housing and does not include staying outside in a car, in a tent, in an abandoned building, in an overnight shelter, or couch-surfing.) No   Are you worried about losing your housing? No   Lack of transportation? No   Unable to get utilities (heat,electricity)? No   Want help with housing or utility concern? No   (!) HOUSING CONCERN PRESENT      5/28/2025   Dental   Dentist two times every year? Yes         Today's PHQ-2 Score:       5/28/2025     6:46 AM   PHQ-2 ( 1999 [employer])   Q1: Little interest or pleasure in doing things 0    Q2: Feeling down, depressed or hopeless 0    PHQ-2 Score 0    Q1: Little interest or pleasure in doing things Not at all   Q2: Feeling down, depressed or hopeless Not at all   PHQ-2 Score 0       Proxy-reported           5/28/2025   Substance Use   Alcohol more than 3/day or more than 7/wk No   Do you use any other substances recreationally? No     Social History  "    Tobacco Use    Smoking status: Former     Current packs/day: 0.00     Types: Cigarettes     Quit date:      Years since quittin.4   Substance Use Topics    Alcohol use: Yes     Comment: rarely    Drug use: No                  2025   STI Screening   New sexual partner(s) since last STI/HIV test? No     History of abnormal Pap smear: No - age 30-64 HPV with reflex Pap every 5 years recommended             2025   Contraception/Family Planning   Questions about contraception or family planning No        Reviewed and updated as needed this visit by Provider   Tobacco  Allergies  Meds  Problems  Med Hx  Surg Hx  Fam Hx            Past Medical History:   Diagnosis Date    Gestational hypertension affecting second pregnancy 2017    Headache in pregnancy, antepartum, third trimester 2017    History of migraine during pregnancy 2017    NO ACTIVE PROBLEMS (aka NONE)     Uncomplicated asthma     exercise induced    Vaginal delivery 2017    Vaginal delivery 2017     Past Surgical History:   Procedure Laterality Date    APPENDECTOMY  2010    CHOLECYSTECTOMY      TUBAL LIGATION       OB History    Para Term  AB Living   4 2 1 1 2 2   SAB IAB Ectopic Multiple Live Births   2 0 0 0 2      # Outcome Date GA Lbr Brandyn/2nd Weight Sex Type Anes PTL Lv   4  17 36w5d 02:45 / 00:26 2.96 kg (6 lb 8.4 oz) F Vag-Spont EPI  LAUREEN      Complications: Preeclampsia/Hypertension      Name: NIC SMITH      Apgar1: 8  Apgar5: 9   3 2016           2 Term 10/2014 37w0d   M   N LAUREEN   1 2008                ROS: 10 point ROS neg other than the symptoms noted above in the HPI.       Objective    Exam  /77 (BP Location: Right arm, Patient Position: Sitting, Cuff Size: Adult Regular)   Pulse 76   Temp 98.2  F (36.8  C) (Oral)   Resp 16   Ht 1.613 m (5' 3.5\")   Wt 76.5 kg (168 lb 11.2 oz)   LMP 2025 (Approximate)   SpO2 98%   BMI " "29.42 kg/m     Estimated body mass index is 29.42 kg/m  as calculated from the following:    Height as of this encounter: 1.613 m (5' 3.5\").    Weight as of this encounter: 76.5 kg (168 lb 11.2 oz).    Physical Exam  Vitals and nursing note reviewed.   Constitutional:       General: She is not in acute distress.     Appearance: Normal appearance. She is not ill-appearing, toxic-appearing or diaphoretic.   HENT:      Head: Normocephalic and atraumatic.      Right Ear: Tympanic membrane, ear canal and external ear normal.      Left Ear: Tympanic membrane, ear canal and external ear normal.      Nose: Nose normal.      Mouth/Throat:      Mouth: Mucous membranes are moist.      Pharynx: No oropharyngeal exudate or posterior oropharyngeal erythema.   Eyes:      General: Lids are normal.      Extraocular Movements: Extraocular movements intact.      Conjunctiva/sclera: Conjunctivae normal.      Pupils: Pupils are equal, round, and reactive to light.   Neck:      Thyroid: No thyroid mass, thyromegaly or thyroid tenderness.   Cardiovascular:      Rate and Rhythm: Normal rate and regular rhythm.      Pulses: Normal pulses.      Heart sounds: Normal heart sounds.   Pulmonary:      Effort: Pulmonary effort is normal.      Breath sounds: Normal breath sounds.   Chest:      Chest wall: No mass, lacerations, deformity, swelling, tenderness, crepitus or edema. There is no dullness to percussion.   Breasts:     Ramon Score is 5.      Breasts are symmetrical.      Right: Normal.      Left: Normal.   Abdominal:      General: Abdomen is flat. Bowel sounds are normal.      Palpations: Abdomen is soft.      Tenderness: There is no abdominal tenderness.      Hernia: No hernia is present. There is no hernia in the left inguinal area or right inguinal area.   Genitourinary:     General: Normal vulva.      Exam position: Lithotomy position.      Pubic Area: No rash.       Labia:         Right: No rash, tenderness, lesion or injury.         " Left: No rash, tenderness, lesion or injury.       Urethra: No prolapse, urethral pain, urethral swelling or urethral lesion.      Vagina: Normal.      Cervix: Normal.      Uterus: Normal.       Adnexa: Right adnexa normal and left adnexa normal.      Rectum: Normal.   Musculoskeletal:         General: Normal range of motion.      Cervical back: Normal range of motion and neck supple. No rigidity or tenderness.   Lymphadenopathy:      Cervical: No cervical adenopathy.      Upper Body:      Right upper body: No supraclavicular, axillary or pectoral adenopathy.      Left upper body: No supraclavicular, axillary or pectoral adenopathy.      Lower Body: No right inguinal adenopathy. No left inguinal adenopathy.   Skin:     General: Skin is warm and dry.      Capillary Refill: Capillary refill takes less than 2 seconds.   Neurological:      General: No focal deficit present.      Mental Status: She is alert.      Deep Tendon Reflexes: Reflexes are normal and symmetric.   Psychiatric:         Mood and Affect: Mood normal.         Speech: Speech normal.         Behavior: Behavior normal. Behavior is cooperative.         Thought Content: Thought content normal.         Judgment: Judgment normal.               Signed Electronically by: CHINEDU Huston CNP

## 2025-05-28 NOTE — PATIENT INSTRUCTIONS
Patient Education   Preventive Care Advice   This is general advice given by our system to help you stay healthy. However, your care team may have specific advice just for you. Please talk to your care team about your preventive care needs.  Nutrition  Eat 5 or more servings of fruits and vegetables each day.  Try wheat bread, brown rice and whole grain pasta (instead of white bread, rice, and pasta).  Get enough calcium and vitamin D. Check the label on foods and aim for 100% of the RDA (recommended daily allowance).  Lifestyle  Exercise at least 150 minutes each week  (30 minutes a day, 5 days a week).  Do muscle strengthening activities 2 days a week. These help control your weight and prevent disease.  No smoking.  Wear sunscreen to prevent skin cancer.  Have a dental exam and cleaning every 6 months.  Yearly exams  See your health care team every year to talk about:  Any changes in your health.  Any medicines your care team has prescribed.  Preventive care, family planning, and ways to prevent chronic diseases.  Shots (vaccines)   HPV shots (up to age 26), if you've never had them before.  Hepatitis B shots (up to age 59), if you've never had them before.  COVID-19 shot: Get this shot when it's due.  Flu shot: Get a flu shot every year.  Tetanus shot: Get a tetanus shot every 10 years.  Pneumococcal, hepatitis A, and RSV shots: Ask your care team if you need these based on your risk.  Shingles shot (for age 50 and up)  General health tests  Diabetes screening:  Starting at age 35, Get screened for diabetes at least every 3 years.  If you are younger than age 35, ask your care team if you should be screened for diabetes.  Cholesterol test: At age 39, start having a cholesterol test every 5 years, or more often if advised.  Bone density scan (DEXA): At age 50, ask your care team if you should have this scan for osteoporosis (brittle bones).  Hepatitis C: Get tested at least once in your life.  STIs (sexually  transmitted infections)  Before age 24: Ask your care team if you should be screened for STIs.  After age 24: Get screened for STIs if you're at risk. You are at risk for STIs (including HIV) if:  You are sexually active with more than one person.  You don't use condoms every time.  You or a partner was diagnosed with a sexually transmitted infection.  If you are at risk for HIV, ask about PrEP medicine to prevent HIV.  Get tested for HIV at least once in your life, whether you are at risk for HIV or not.  Cancer screening tests  Cervical cancer screening: If you have a cervix, begin getting regular cervical cancer screening tests starting at age 21.  Breast cancer scan (mammogram): If you've ever had breasts, begin having regular mammograms starting at age 40. This is a scan to check for breast cancer.  Colon cancer screening: It is important to start screening for colon cancer at age 45.  Have a colonoscopy test every 10 years (or more often if you're at risk) Or, ask your provider about stool tests like a FIT test every year or Cologuard test every 3 years.  To learn more about your testing options, visit:   .  For help making a decision, visit:   https://bit.ly/nm96858.  Prostate cancer screening test: If you have a prostate, ask your care team if a prostate cancer screening test (PSA) at age 55 is right for you.  Lung cancer screening: If you are a current or former smoker ages 50 to 80, ask your care team if ongoing lung cancer screenings are right for you.  For informational purposes only. Not to replace the advice of your health care provider. Copyright   2023 [facility]. All rights reserved. Clinically reviewed by the [facility] Transitions Program. [employer] 990603 - REV 01/24.  Learning About Stress  What is stress?     Stress is your body's response to a hard situation. Your body can have a physical, emotional, or mental response. Stress is a fact of life for most people, and it  affects everyone differently. What causes stress for you may not be stressful for someone else.  A lot of things can cause stress. You may feel stress when you go on a job interview, take a test, or run a race. This kind of short-term stress is normal and even useful. It can help you if you need to work hard or react quickly. For example, stress can help you finish an important job on time.  Long-term stress is caused by ongoing stressful situations or events. Examples of long-term stress include long-term health problems, ongoing problems at work, or conflicts in your family. Long-term stress can harm your health.  How does stress affect your health?  When you are stressed, your body responds as though you are in danger. It makes hormones that speed up your heart, make you breathe faster, and give you a burst of energy. This is called the fight-or-flight stress response. If the stress is over quickly, your body goes back to normal and no harm is done.  But if stress happens too often or lasts too long, it can have bad effects. Long-term stress can make you more likely to get sick, and it can make symptoms of some diseases worse. If you tense up when you are stressed, you may develop neck, shoulder, or low back pain. Stress is linked to high blood pressure and heart disease.  Stress also harms your emotional health. It can make you kent, tense, or depressed. Your relationships may suffer, and you may not do well at work or school.  What can you do to manage stress?  You can try these things to help manage stress:   Do something active. Exercise or activity can help reduce stress. Walking is a great way to get started. Even everyday activities such as housecleaning or yard work can help.  Try yoga or betina chi. These techniques combine exercise and meditation. You may need some training at first to learn them.  Do something you enjoy. For example, listen to music or go to a movie. Practice your hobby or do volunteer  "work.  Meditate. This can help you relax, because you are not worrying about what happened before or what may happen in the future.  Do guided imagery. Imagine yourself in any setting that helps you feel calm. You can use online videos, books, or a teacher to guide you.  Do breathing exercises. For example:  From a standing position, bend forward from the waist with your knees slightly bent. Let your arms dangle close to the floor.  Breathe in slowly and deeply as you return to a standing position. Roll up slowly and lift your head last.  Hold your breath for just a few seconds in the standing position.  Breathe out slowly and bend forward from the waist.  Let your feelings out. Talk, laugh, cry, and express anger when you need to. Talking with supportive friends or family, a counselor, or a isis leader about your feelings is a healthy way to relieve stress. Avoid discussing your feelings with people who make you feel worse.  Write. It may help to write about things that are bothering you. This helps you find out how much stress you feel and what is causing it. When you know this, you can find better ways to cope.  What can you do to prevent stress?  You might try some of these things to help prevent stress:  Manage your time. This helps you find time to do the things you want and need to do.  Get enough sleep. Your body recovers from the stresses of the day while you are sleeping.  Get support. Your family, friends, and community can make a difference in how you experience stress.  Limit your news feed. Avoid or limit time on social media or news that may make you feel stressed.  Do something active. Exercise or activity can help reduce stress. Walking is a great way to get started.  Where can you learn more?  Go to https://www.[employer].net/patiented  Enter N032 in the search box to learn more about \"Learning About Stress.\"  Current as of: October 24, 2024  Content Version: 14.4 2024-2025 [employer].   Care instructions adapted under license by your healthcare professional. If you have questions about a medical condition or this instruction, always ask your healthcare professional. [employer] disclaims any warranty or liability for your use of this information.

## 2025-05-29 ENCOUNTER — RESULTS FOLLOW-UP (OUTPATIENT)
Dept: FAMILY MEDICINE | Facility: CLINIC | Age: 35
End: 2025-05-29

## 2025-05-29 LAB
HPV HR 12 DNA CVX QL NAA+PROBE: NEGATIVE
HPV16 DNA CVX QL NAA+PROBE: NEGATIVE
HPV18 DNA CVX QL NAA+PROBE: NEGATIVE
HUMAN PAPILLOMA VIRUS FINAL DIAGNOSIS: NORMAL

## 2025-06-03 LAB
BKR AP ASSOCIATED HPV REPORT: NORMAL
BKR LAB AP GYN ADEQUACY: NORMAL
BKR LAB AP GYN INTERPRETATION: NORMAL
BKR LAB AP LMP: NORMAL
BKR LAB AP PREVIOUS ABNORMAL: NORMAL
PATH REPORT.COMMENTS IMP SPEC: NORMAL
PATH REPORT.COMMENTS IMP SPEC: NORMAL
PATH REPORT.RELEVANT HX SPEC: NORMAL